# Patient Record
Sex: FEMALE | Race: WHITE | NOT HISPANIC OR LATINO | Employment: FULL TIME | URBAN - METROPOLITAN AREA
[De-identification: names, ages, dates, MRNs, and addresses within clinical notes are randomized per-mention and may not be internally consistent; named-entity substitution may affect disease eponyms.]

---

## 2017-01-07 ENCOUNTER — HOSPITAL ENCOUNTER (INPATIENT)
Facility: HOSPITAL | Age: 62
LOS: 4 days | Discharge: HOME HEALTH | End: 2017-01-11
Attending: EMERGENCY MEDICINE | Admitting: INTERNAL MEDICINE

## 2017-01-07 ENCOUNTER — APPOINTMENT (OUTPATIENT)
Dept: GENERAL RADIOLOGY | Facility: HOSPITAL | Age: 62
End: 2017-01-07

## 2017-01-07 DIAGNOSIS — R26.2 DIFFICULTY WALKING: ICD-10-CM

## 2017-01-07 DIAGNOSIS — S72.002A CLOSED FRACTURE OF NECK OF LEFT FEMUR, INITIAL ENCOUNTER (HCC): Primary | ICD-10-CM

## 2017-01-07 DIAGNOSIS — S72.009A FEMORAL NECK FRACTURE (HCC): ICD-10-CM

## 2017-01-07 LAB
ABO GROUP BLD: NORMAL
ALBUMIN SERPL-MCNC: 4.4 G/DL (ref 3.5–5.2)
ALBUMIN/GLOB SERPL: 1.6 G/DL
ALP SERPL-CCNC: 118 U/L (ref 39–117)
ALT SERPL W P-5'-P-CCNC: 23 U/L (ref 1–33)
ANION GAP SERPL CALCULATED.3IONS-SCNC: 16 MMOL/L
AST SERPL-CCNC: 23 U/L (ref 1–32)
BASOPHILS # BLD AUTO: 0.04 10*3/MM3 (ref 0–0.2)
BASOPHILS NFR BLD AUTO: 0.3 % (ref 0–1.5)
BILIRUB SERPL-MCNC: 0.5 MG/DL (ref 0.1–1.2)
BLD GP AB SCN SERPL QL: NEGATIVE
BUN BLD-MCNC: 14 MG/DL (ref 6–20)
BUN/CREAT SERPL: 25.5 (ref 7–25)
CALCIUM SPEC-SCNC: 9.3 MG/DL (ref 8.6–10.5)
CHLORIDE SERPL-SCNC: 101 MMOL/L (ref 98–107)
CO2 SERPL-SCNC: 23 MMOL/L (ref 22–29)
CREAT BLD-MCNC: 0.55 MG/DL (ref 0.57–1)
DEPRECATED RDW RBC AUTO: 46.3 FL (ref 37–54)
EOSINOPHIL # BLD AUTO: 0.13 10*3/MM3 (ref 0–0.7)
EOSINOPHIL NFR BLD AUTO: 1 % (ref 0.3–6.2)
ERYTHROCYTE [DISTWIDTH] IN BLOOD BY AUTOMATED COUNT: 12.8 % (ref 11.7–13)
GFR SERPL CREATININE-BSD FRML MDRD: 113 ML/MIN/1.73
GLOBULIN UR ELPH-MCNC: 2.7 GM/DL
GLUCOSE BLD-MCNC: 111 MG/DL (ref 65–99)
HCT VFR BLD AUTO: 40.4 % (ref 35.6–45.5)
HGB BLD-MCNC: 13.5 G/DL (ref 11.9–15.5)
IMM GRANULOCYTES # BLD: 0.02 10*3/MM3 (ref 0–0.03)
IMM GRANULOCYTES NFR BLD: 0.2 % (ref 0–0.5)
INR PPP: 0.99 (ref 0.9–1.1)
LYMPHOCYTES # BLD AUTO: 1.33 10*3/MM3 (ref 0.9–4.8)
LYMPHOCYTES NFR BLD AUTO: 10.4 % (ref 19.6–45.3)
MCH RBC QN AUTO: 33 PG (ref 26.9–32)
MCHC RBC AUTO-ENTMCNC: 33.4 G/DL (ref 32.4–36.3)
MCV RBC AUTO: 98.8 FL (ref 80.5–98.2)
MONOCYTES # BLD AUTO: 0.86 10*3/MM3 (ref 0.2–1.2)
MONOCYTES NFR BLD AUTO: 6.7 % (ref 5–12)
NEUTROPHILS # BLD AUTO: 10.46 10*3/MM3 (ref 1.9–8.1)
NEUTROPHILS NFR BLD AUTO: 81.4 % (ref 42.7–76)
PLATELET # BLD AUTO: 265 10*3/MM3 (ref 140–500)
PMV BLD AUTO: 10.2 FL (ref 6–12)
POTASSIUM BLD-SCNC: 4 MMOL/L (ref 3.5–5.2)
PROT SERPL-MCNC: 7.1 G/DL (ref 6–8.5)
PROTHROMBIN TIME: 12.7 SECONDS (ref 11.7–14.2)
RBC # BLD AUTO: 4.09 10*6/MM3 (ref 3.9–5.2)
RH BLD: NEGATIVE
SODIUM BLD-SCNC: 140 MMOL/L (ref 136–145)
TSH SERPL DL<=0.05 MIU/L-ACNC: 3.69 MIU/ML (ref 0.27–4.2)
WBC NRBC COR # BLD: 12.84 10*3/MM3 (ref 4.5–10.7)

## 2017-01-07 PROCEDURE — 84443 ASSAY THYROID STIM HORMONE: CPT | Performed by: INTERNAL MEDICINE

## 2017-01-07 PROCEDURE — 86900 BLOOD TYPING SEROLOGIC ABO: CPT

## 2017-01-07 PROCEDURE — 86901 BLOOD TYPING SEROLOGIC RH(D): CPT

## 2017-01-07 PROCEDURE — 85025 COMPLETE CBC W/AUTO DIFF WBC: CPT | Performed by: EMERGENCY MEDICINE

## 2017-01-07 PROCEDURE — 73502 X-RAY EXAM HIP UNI 2-3 VIEWS: CPT

## 2017-01-07 PROCEDURE — 25010000002 HYDROMORPHONE PER 4 MG: Performed by: EMERGENCY MEDICINE

## 2017-01-07 PROCEDURE — 25010000002 MORPHINE PER 10 MG: Performed by: INTERNAL MEDICINE

## 2017-01-07 PROCEDURE — 25010000002 HYDROMORPHONE PER 4 MG: Performed by: HOSPITALIST

## 2017-01-07 PROCEDURE — 63710000001 DIPHENHYDRAMINE PER 50 MG: Performed by: ORTHOPAEDIC SURGERY

## 2017-01-07 PROCEDURE — 25010000002 HYDROMORPHONE PER 4 MG

## 2017-01-07 PROCEDURE — 93005 ELECTROCARDIOGRAM TRACING: CPT | Performed by: EMERGENCY MEDICINE

## 2017-01-07 PROCEDURE — 93010 ELECTROCARDIOGRAM REPORT: CPT | Performed by: INTERNAL MEDICINE

## 2017-01-07 PROCEDURE — 86850 RBC ANTIBODY SCREEN: CPT

## 2017-01-07 PROCEDURE — 80053 COMPREHEN METABOLIC PANEL: CPT | Performed by: EMERGENCY MEDICINE

## 2017-01-07 PROCEDURE — 85610 PROTHROMBIN TIME: CPT | Performed by: EMERGENCY MEDICINE

## 2017-01-07 PROCEDURE — 99284 EMERGENCY DEPT VISIT MOD MDM: CPT

## 2017-01-07 PROCEDURE — 25010000002 ONDANSETRON PER 1 MG: Performed by: EMERGENCY MEDICINE

## 2017-01-07 PROCEDURE — 71010 HC CHEST PA OR AP: CPT

## 2017-01-07 RX ORDER — NALOXONE HCL 0.4 MG/ML
0.4 VIAL (ML) INJECTION
Status: DISCONTINUED | OUTPATIENT
Start: 2017-01-07 | End: 2017-01-09 | Stop reason: HOSPADM

## 2017-01-07 RX ORDER — RENO CAPS 100; 1.5; 1.7; 20; 10; 1; 150; 5; 6 MG/1; MG/1; MG/1; MG/1; MG/1; MG/1; UG/1; MG/1; UG/1
1 CAPSULE ORAL DAILY
Status: DISCONTINUED | OUTPATIENT
Start: 2017-01-07 | End: 2017-01-09 | Stop reason: HOSPADM

## 2017-01-07 RX ORDER — HYDROCODONE BITARTRATE AND ACETAMINOPHEN 7.5; 325 MG/1; MG/1
1 TABLET ORAL EVERY 4 HOURS PRN
Status: DISCONTINUED | OUTPATIENT
Start: 2017-01-07 | End: 2017-01-09 | Stop reason: HOSPADM

## 2017-01-07 RX ORDER — ACETAMINOPHEN, ASPIRIN AND CAFFEINE 250; 250; 65 MG/1; MG/1; MG/1
1 TABLET, FILM COATED ORAL EVERY 6 HOURS PRN
Status: DISCONTINUED | OUTPATIENT
Start: 2017-01-07 | End: 2017-01-09 | Stop reason: HOSPADM

## 2017-01-07 RX ORDER — CYCLOBENZAPRINE HCL 10 MG
10 TABLET ORAL EVERY 8 HOURS SCHEDULED
Status: DISCONTINUED | OUTPATIENT
Start: 2017-01-07 | End: 2017-01-07

## 2017-01-07 RX ORDER — MORPHINE SULFATE 2 MG/ML
1 INJECTION, SOLUTION INTRAMUSCULAR; INTRAVENOUS EVERY 4 HOURS PRN
Status: DISCONTINUED | OUTPATIENT
Start: 2017-01-07 | End: 2017-01-07

## 2017-01-07 RX ORDER — CYCLOBENZAPRINE HCL 10 MG
10 TABLET ORAL 3 TIMES DAILY PRN
Status: DISCONTINUED | OUTPATIENT
Start: 2017-01-07 | End: 2017-01-09 | Stop reason: HOSPADM

## 2017-01-07 RX ORDER — DIAZEPAM 5 MG/1
5 TABLET ORAL EVERY 6 HOURS PRN
Status: DISCONTINUED | OUTPATIENT
Start: 2017-01-07 | End: 2017-01-09 | Stop reason: HOSPADM

## 2017-01-07 RX ORDER — SODIUM CHLORIDE 9 MG/ML
75 INJECTION, SOLUTION INTRAVENOUS CONTINUOUS
Status: DISCONTINUED | OUTPATIENT
Start: 2017-01-07 | End: 2017-01-09 | Stop reason: HOSPADM

## 2017-01-07 RX ORDER — ONDANSETRON 2 MG/ML
4 INJECTION INTRAMUSCULAR; INTRAVENOUS ONCE
Status: COMPLETED | OUTPATIENT
Start: 2017-01-07 | End: 2017-01-07

## 2017-01-07 RX ORDER — DIPHENHYDRAMINE HCL 25 MG
25 CAPSULE ORAL EVERY 6 HOURS PRN
Status: DISCONTINUED | OUTPATIENT
Start: 2017-01-07 | End: 2017-01-09 | Stop reason: HOSPADM

## 2017-01-07 RX ORDER — CHOLECALCIFEROL (VITAMIN D3) 125 MCG
5 CAPSULE ORAL NIGHTLY
Status: DISCONTINUED | OUTPATIENT
Start: 2017-01-07 | End: 2017-01-09 | Stop reason: HOSPADM

## 2017-01-07 RX ORDER — ONDANSETRON 2 MG/ML
4 INJECTION INTRAMUSCULAR; INTRAVENOUS EVERY 6 HOURS PRN
Status: DISCONTINUED | OUTPATIENT
Start: 2017-01-07 | End: 2017-01-09 | Stop reason: HOSPADM

## 2017-01-07 RX ORDER — ACETAMINOPHEN 325 MG/1
650 TABLET ORAL EVERY 4 HOURS PRN
Status: DISCONTINUED | OUTPATIENT
Start: 2017-01-07 | End: 2017-01-09 | Stop reason: HOSPADM

## 2017-01-07 RX ORDER — SODIUM CHLORIDE 0.9 % (FLUSH) 0.9 %
1-10 SYRINGE (ML) INJECTION AS NEEDED
Status: DISCONTINUED | OUTPATIENT
Start: 2017-01-07 | End: 2017-01-09 | Stop reason: HOSPADM

## 2017-01-07 RX ORDER — HYDROMORPHONE HYDROCHLORIDE 1 MG/ML
0.5 INJECTION, SOLUTION INTRAMUSCULAR; INTRAVENOUS; SUBCUTANEOUS
Status: DISCONTINUED | OUTPATIENT
Start: 2017-01-07 | End: 2017-01-08

## 2017-01-07 RX ORDER — MULTIPLE VITAMINS W/ MINERALS TAB 9MG-400MCG
1 TAB ORAL DAILY
Status: DISCONTINUED | OUTPATIENT
Start: 2017-01-07 | End: 2017-01-09 | Stop reason: HOSPADM

## 2017-01-07 RX ORDER — HYDROCODONE BITARTRATE AND ACETAMINOPHEN 7.5; 325 MG/1; MG/1
2 TABLET ORAL EVERY 4 HOURS PRN
Status: DISCONTINUED | OUTPATIENT
Start: 2017-01-07 | End: 2017-01-09 | Stop reason: HOSPADM

## 2017-01-07 RX ADMIN — HYDROCODONE BITARTRATE AND ACETAMINOPHEN 1 TABLET: 7.5; 325 TABLET ORAL at 16:37

## 2017-01-07 RX ADMIN — HYDROMORPHONE HYDROCHLORIDE 1 MG: 1 INJECTION, SOLUTION INTRAMUSCULAR; INTRAVENOUS; SUBCUTANEOUS at 14:42

## 2017-01-07 RX ADMIN — HYDROMORPHONE HYDROCHLORIDE 1 MG: 1 INJECTION, SOLUTION INTRAMUSCULAR; INTRAVENOUS; SUBCUTANEOUS at 11:44

## 2017-01-07 RX ADMIN — ONDANSETRON 4 MG: 2 INJECTION INTRAMUSCULAR; INTRAVENOUS at 11:44

## 2017-01-07 RX ADMIN — MORPHINE SULFATE 1 MG: 2 INJECTION, SOLUTION INTRAMUSCULAR; INTRAVENOUS at 16:37

## 2017-01-07 RX ADMIN — HYDROMORPHONE HYDROCHLORIDE 0.5 MG: 1 INJECTION, SOLUTION INTRAMUSCULAR; INTRAVENOUS; SUBCUTANEOUS at 20:24

## 2017-01-07 RX ADMIN — CYCLOBENZAPRINE HYDROCHLORIDE 10 MG: 10 TABLET, FILM COATED ORAL at 17:48

## 2017-01-07 RX ADMIN — HYDROMORPHONE HYDROCHLORIDE 1 MG: 1 INJECTION, SOLUTION INTRAMUSCULAR; INTRAVENOUS; SUBCUTANEOUS at 13:14

## 2017-01-07 RX ADMIN — HYDROMORPHONE HYDROCHLORIDE 0.5 MG: 1 INJECTION, SOLUTION INTRAMUSCULAR; INTRAVENOUS; SUBCUTANEOUS at 23:27

## 2017-01-07 RX ADMIN — DIAZEPAM 5 MG: 5 TABLET ORAL at 20:29

## 2017-01-07 RX ADMIN — Medication 5 MG: at 22:40

## 2017-01-07 RX ADMIN — DIPHENHYDRAMINE HYDROCHLORIDE 25 MG: 25 CAPSULE ORAL at 17:48

## 2017-01-07 RX ADMIN — Medication 1 MG: at 13:14

## 2017-01-07 NOTE — Clinical Note
Level of Care: Med/Surg [1]   Diagnosis: Closed fracture of neck of left femur, initial encounter [836935]   Admitting Physician: CECI LR [[id number]]   Attending Physician: CECI LR [[id number]]

## 2017-01-07 NOTE — ED PROVIDER NOTES
EMERGENCY DEPARTMENT ENCOUNTER    CHIEF COMPLAINT  Chief Complaint: Fall, hip pain  History given by: Pt  History limited by: N/A  Room Number: 21/21  PMD: RODOLFO Doyle MD      HPI:  Pt is a 61 y.o. female who presents complaining of fall and subsequent L hip pain after slipping on ice in her driveway last night. Pt reports pain following the event, which radiates down her L leg and was worse when she woke this morning. Pt reports feeling a pop this morning while walking. Her pain is severe and worsens with movement. Pt also c/o L leg numbness. She smokes 1/2 pack per day and drinks 4 beers after work daily. Pt denies fever, headache, neck pain, back pain, vomiting, diarrhea, black/bloody stool, or any other injury at this time.    Duration:  1 day  Onset: Sudden  Timing: Constant  Location: L hip  Radiation: L leg  Quality: Aching  Intensity/Severity: Severe  Progression: Unchanged  Associated Symptoms: L leg numbness  Aggravating Factors: Movement, standing  Alleviating Factors: Nothing  Previous Episodes: No  Treatment before arrival: None specified    PAST MEDICAL HISTORY  Active Ambulatory Problems     Diagnosis Date Noted   • No Active Ambulatory Problems     Resolved Ambulatory Problems     Diagnosis Date Noted   • No Resolved Ambulatory Problems     No Additional Past Medical History       PAST SURGICAL HISTORY  Past Surgical History   Procedure Laterality Date   • Colon resection left  06/2013     due to several ruptured diverticuli        FAMILY HISTORY  History reviewed. No pertinent family history.    SOCIAL HISTORY  Social History     Social History   • Marital status:      Spouse name: N/A   • Number of children: N/A   • Years of education: N/A     Occupational History   • Not on file.     Social History Main Topics   • Smoking status: Current Every Day Smoker     Packs/day: 0.25     Types: Cigarettes   • Smokeless tobacco: Not on file   • Alcohol use 1.2 - 1.8 oz/week     2 - 3 Cans of  beer per week      Comment: per day    • Drug use: No   • Sexual activity: Defer     Other Topics Concern   • Not on file     Social History Narrative       ALLERGIES  Penicillins    REVIEW OF SYSTEMS  Review of Systems   Constitutional: Negative.  Negative for fever and unexpected weight change.   HENT: Negative.    Respiratory: Negative.    Cardiovascular: Negative.    Gastrointestinal: Negative.  Negative for blood in stool, diarrhea and vomiting.   Genitourinary: Negative.    Musculoskeletal: Positive for arthralgias (L hip). Negative for back pain and neck pain.   Neurological: Positive for numbness (L leg). Negative for headaches.   All other systems reviewed and are negative.      PHYSICAL EXAM  ED Triage Vitals   Temp Heart Rate Resp BP SpO2   01/07/17 1118 01/07/17 1118 01/07/17 1118 01/07/17 1118 01/07/17 1118   98.2 °F (36.8 °C) 71 18 149/91 97 %      Temp src Heart Rate Source Patient Position BP Location FiO2 (%)   01/07/17 1118 01/07/17 1118 -- -- --   Oral Monitor          Physical Exam   Constitutional: She is oriented to person, place, and time.   HENT:   Head: Normocephalic and atraumatic.   Mouth/Throat: Oropharynx is clear and moist.   Eyes: EOM are normal. Pupils are equal, round, and reactive to light.   Neck: Normal range of motion. Neck supple.   Cardiovascular: Normal rate, regular rhythm and normal heart sounds.    Pulmonary/Chest: Effort normal and breath sounds normal. No respiratory distress.   Abdominal: Soft. There is no tenderness. There is no rebound and no guarding.   Musculoskeletal: She exhibits no edema.        Right hip: Normal.        Left hip: She exhibits decreased range of motion (secondary to pain), tenderness, bony tenderness and deformity (shortening and external rotation).   Neurological: She is alert and oriented to person, place, and time. She has normal sensation and normal strength.   Skin: Skin is warm and dry. No rash noted.   Psychiatric: Mood and affect normal.    Nursing note and vitals reviewed.      LAB RESULTS  Lab Results (last 24 hours)     Procedure Component Value Units Date/Time    CBC & Differential [[id number]] Collected:  01/07/17 1313    Specimen:  Blood Updated:  01/07/17 1331    Narrative:       The following orders were created for panel order CBC & Differential.  Procedure                               Abnormality         Status                     ---------                               -----------         ------                     CBC Auto Differential[[id number]]         Abnormal            Final result                 Please view results for these tests on the individual orders.    Comprehensive Metabolic Panel [[id number]]  (Abnormal) Collected:  01/07/17 1313    Specimen:  Blood Updated:  01/07/17 1354     Glucose 111 (H) mg/dL      BUN 14 mg/dL      Creatinine 0.55 (L) mg/dL      Sodium 140 mmol/L      Potassium 4.0 mmol/L      Chloride 101 mmol/L      CO2 23.0 mmol/L      Calcium 9.3 mg/dL      Total Protein 7.1 g/dL      Albumin 4.40 g/dL      ALT (SGPT) 23 U/L      AST (SGOT) 23 U/L      Alkaline Phosphatase 118 (H) U/L      Total Bilirubin 0.5 mg/dL      eGFR Non African Amer 113 mL/min/1.73      Globulin 2.7 gm/dL      A/G Ratio 1.6 g/dL      BUN/Creatinine Ratio 25.5 (H)      Anion Gap 16.0 mmol/L     Protime-INR [[id number]]  (Normal) Collected:  01/07/17 1313    Specimen:  Blood Updated:  01/07/17 1339     Protime 12.7 Seconds      INR 0.99     CBC Auto Differential [[id number]]  (Abnormal) Collected:  01/07/17 1313    Specimen:  Blood Updated:  01/07/17 1331     WBC 12.84 (H) 10*3/mm3      RBC 4.09 10*6/mm3      Hemoglobin 13.5 g/dL      Hematocrit 40.4 %      MCV 98.8 (H) fL      MCH 33.0 (H) pg      MCHC 33.4 g/dL      RDW 12.8 %      RDW-SD 46.3 fl      MPV 10.2 fL      Platelets 265 10*3/mm3      Neutrophil % 81.4 (H) %      Lymphocyte % 10.4 (L) %      Monocyte % 6.7 %      Eosinophil % 1.0 %      Basophil % 0.3 %      Immature Grans % 0.2 %       Neutrophils, Absolute 10.46 (H) 10*3/mm3      Lymphocytes, Absolute 1.33 10*3/mm3      Monocytes, Absolute 0.86 10*3/mm3      Eosinophils, Absolute 0.13 10*3/mm3      Basophils, Absolute 0.04 10*3/mm3      Immature Grans, Absolute 0.02 10*3/mm3           I ordered the above labs and reviewed the results    RADIOLOGY  XR Chest 1 View   Final Result   No evidence for acute pulmonary process. Follow-up as   clinical indications persist.       This report was finalized on 1/7/2017 1:18 PM by Dr. Prabhu Bowen MD.          XR Hip With or Without Pelvis 2 - 3 View Left   Preliminary Result   Acute left subcapital femoral neck fracture. There is varus   angulation which might account for the poor demarcation of the fracture   plane along the femoral head side of the fracture. However, the   possibility of an underlying bone lesion should be considered. There are   no findings elsewhere to suggest other bone lesions.             I ordered the above noted radiological studies. Interpreted by radiologist. Discussed with radiologist. Reviewed by me in PACS.     EKG         EKG time: 13:11  Rhythm/Rate: NSR at 78 bpm  P waves and NM: Normal  QRS, axis: RBBB  ST and T waves: Unremarkable  Interpreted contemporaneously by me and independently viewed.  No prior EKG for comparison.    PROCEDURES  Procedures      PROGRESS AND CONSULTS  ED Course   Comment By Time   11:29 AM  Pt with fall last night.  L hip pain increased over night.  Now unable to bear weight.  Exam suggests femoral fx.  Will give IV DZ for pain. Hiren Crowe MD 01/07 1130   11:22 AM:  Vitals: BP: 149/91 HR: 71 Temp: 98.2 °F (36.8 °C) (Oral) O2 sat: 97%  D/w pt plan for XR L Hip for further evaluation. Ordered Dilaudid and Zofran for pain management. Pt understands and agrees with the plan, all questions answered.    1:04 PM:  Call placed to [facility] for admission, Ordered labs, CXR, and EKG for further evaluation.    1:15 PM:  Vitals: BP: 139/87 HR: 71 Temp:  98.2 °F (36.8 °C) (Oral) O2 sat: 94%  Rechecked pt. Pt is resting comfortably. Discussed results of imaging, which showed fx, and the need for admission for further care. Pt understands and agrees with the plan, all questions answered.    1:30 PM:  Discussed pt's case with Dr. Latasha Faulkner ([facility]), who agrees to admit the pt for further care. Call placed to Ortho for consult.    2:06 PM:  Vitals: BP: 142/82 HR: 71 Temp: 98.2 °F (36.8 °C) (Oral) O2 sat: 95%  Rechecked pt. Pt is resting comfortably. Discussed that I have not heard from ortho, but that I have spoken to Dr. Faulkner, who will admit the pt. Pt understands and agrees with the plan, all questions answered.    2:14 PM:  Discussed pt's case with Dr. Horvath ([facility]), who agrees to consult with the pt upon admission.    MEDICAL DECISION MAKING  Results were reviewed/discussed with the patient and they were also made aware of online access. Pt also made aware that some labs, such as cultures, will not be resulted during ER visit and follow up with PMD is necessary.     MDM  Number of Diagnoses or Management Options  Closed fracture of neck of left femur, initial encounter:      Amount and/or Complexity of Data Reviewed  Clinical lab tests: ordered and reviewed  Tests in the radiology section of CPT®: ordered and reviewed  Tests in the medicine section of CPT®: ordered and reviewed  Discussion of test results with the performing providers: yes  Discuss the patient with other providers: yes  Independent visualization of images, tracings, or specimens: yes    Patient Progress  Patient progress: stable         DIAGNOSIS  Final diagnoses:   Closed fracture of neck of left femur, initial encounter       DISPOSITION  ADMISSION    Discussed treatment plan and reason for admission with pt/family and admitting physician.  Pt/family voiced understanding of the plan for admission for further testing/treatment as needed.     Latest Documented Vital Signs:  As of 2:14 PM  BP-  142/82 HR- 71 Temp- 98.2 °F (36.8 °C) (Oral) O2 sat- 95%    --  Documentation assistance provided by rody Zelaya for Dr. Crowe.  Information recorded by the scribe was done at my direction and has been verified and validated by me.     Jd Zelaya  01/07/17 1414       Hiren Crowe MD  01/07/17 1026

## 2017-01-07 NOTE — H&P
Dictated    1. Left subcap femur fracture    2. cigs  3. etoh  4. Abn ekg  5. 2013 partial colectomy for ruptured tics    [id number]

## 2017-01-07 NOTE — IP AVS SNAPSHOT
AFTER VISIT SUMMARY             Chantell Knight           About your hospitalization     You were admitted on:  January 7, 2017 You last received care in the:  [address]       Procedures & Surgeries      Procedure(s) (LRB):  LEFT TOTAL HIP ARTHROPLASTY (POSTERIOR) (Left)     1/7/2017 - 1/9/2017     Surgeon(s):  Trever Marcos MD  -------------------      Medications    If you or your caregiver advised us that you are currently taking a medication and that medication is marked below as “Resume”, this simply indicates that we have reviewed those medications to make sure our new therapy recommendations do not interfere.  If you have concerns about medications other than those new ones which we are prescribing today, please consult the physician who prescribed them (or your primary physician).  Our review of your home medications is not meant to indicate that we are directing their use.             Your Medications      START taking these medications     acetaminophen 325 MG tablet   Take 2 tablets by mouth Every 6 (Six) Hours As Needed for mild pain (1-3) for up to 5 days. Indications: Pain   Commonly known as:  TYLENOL   Notes to Patient:  Every 6 hours as needed           aspirin 325 MG EC tablet   Take 1 tablet by mouth 2 (Two) Times a Day for 29 days.   Last time this was given:  1/11/2017  8:38 AM   Next Dose Due:  6:00pm 1/11/17           HYDROcodone-acetaminophen 7.5-325 MG per tablet   Take 2 tablets by mouth Every 4 (Four) Hours As Needed for severe pain (7-10) for up to 8 days.   Last time this was given:  1/11/2017  7:34 AM   Commonly known as:  NORCO   Notes to Patient:  Every 4 hours as needed           melatonin 1 MG tablet   Take 1 tablet by mouth At Night As Needed for sleep for up to 5 days. Indications: Trouble Sleeping   Last time this was given:  1/8/2017  9:03 PM   Notes to Patient:  Every night as needed           meloxicam 15 MG tablet   Take 1 tablet by mouth Daily for  29 days. Indications: Joint Damage causing Pain and Loss of Function   Last time this was given:  1/11/2017  8:38 AM   Commonly known as:  MOBIC             CONTINUE taking these medications     amphetamine-dextroamphetamine 10 MG tablet   TK 1 T PO QD FOR ADD   Commonly known as:  ADDERALL   Notes to Patient:  As directed           aspirin-acetaminophen-caffeine 250-250-65 MG per tablet   Take 1 tablet by mouth Every 6 (Six) Hours As Needed for headaches.   Commonly known as:  EXCEDRIN MIGRAINE   Notes to Patient:  Every 6 hours as needed           b complex-C-folic acid 1 MG capsule   Take 1 capsule by mouth Daily.   Notes to Patient:  As dierected           fish oil 1000 MG capsule capsule   Take 2,000 mg by mouth Daily With Breakfast.   Notes to Patient:  As directed           MULTIVITAL PO   Take 1 tablet by mouth Daily.   Notes to Patient:  As directed                Where to Get Your Medications      These medications were sent to [facility] [address] [place] [address] [address] [facility] - [phone] [facility] [phone] [address] [address]     Phone:  [phone]     meloxicam 15 MG tablet         You can get these medications from any pharmacy     Bring a paper prescription for each of these medications     HYDROcodone-acetaminophen 7.5-325 MG per tablet         Information about where to get these medications is not yet available     ! Ask your nurse or doctor about these medications     acetaminophen 325 MG tablet    aspirin 325 MG EC tablet    melatonin 1 MG tablet                  Your Medications      Your Medication List           Morning Noon Evening Bedtime As Needed    acetaminophen 325 MG tablet   Take 2 tablets by mouth Every 6 (Six) Hours As Needed for mild pain (1-3) for up to 5 days. Indications: Pain   Commonly known as:  TYLENOL   Notes to Patient:  Every 6 hours as needed                                    amphetamine-dextroamphetamine 10 MG tablet   TK 1 T PO QD FOR ADD   Commonly known as:  ADDERALL   Notes to Patient:  As directed                                   aspirin 325 MG EC tablet   Take 1 tablet by mouth 2 (Two) Times a Day for 29 days.                                      aspirin-acetaminophen-caffeine 250-250-65 MG per tablet   Take 1 tablet by mouth Every 6 (Six) Hours As Needed for headaches.   Commonly known as:  EXCEDRIN MIGRAINE   Notes to Patient:  Every 6 hours as needed                                   b complex-C-folic acid 1 MG capsule   Take 1 capsule by mouth Daily.   Notes to Patient:  As dierected                                   fish oil 1000 MG capsule capsule   Take 2,000 mg by mouth Daily With Breakfast.   Notes to Patient:  As directed                                   HYDROcodone-acetaminophen 7.5-325 MG per tablet   Take 2 tablets by mouth Every 4 (Four) Hours As Needed for severe pain (7-10) for up to 8 days.   Commonly known as:  NORCO   Notes to Patient:  Every 4 hours as needed                                   melatonin 1 MG tablet   Take 1 tablet by mouth At Night As Needed for sleep for up to 5 days. Indications: Trouble Sleeping   Notes to Patient:  Every night as needed                                   meloxicam 15 MG tablet   Take 1 tablet by mouth Daily for 29 days. Indications: Joint Damage causing Pain and Loss of Function   Commonly known as:  MOBIC                                   MULTIVITAL PO   Take 1 tablet by mouth Daily.   Notes to Patient:  As directed                                            Instructions for After Discharge        Activity Instructions     Other Instructions (Specify)       Restrictions per ortho           Additional Activity Instructions:      WEIGHT BEARING AS TOLERATED WITH ASSIST OF A WALKER              Diet Instructions     Diet: Regular; Thin Liquids, No Restrictions       Discharge Diet:  Regular   Fluid Consistency:  Thin Liquids,  No Restrictions               REGULAR DIET              Other Instructions     Commode Chair       Equipment:   Bedside Commode Chair w/Fixed Arms   Length of Need (99 Months = Lifetime):  99 Months = Lifetime             Discharge References/Attachments     TOTAL HIP REPLACEMENT (ENGLISH)    ASPIRIN, ASA ORAL TABLETS (ENGLISH)    ACETAMINOPHEN; HYDROCODONE TABLETS OR CAPSULES (ENGLISH)    FALL PREVENTION AND HOME SAFETY (ENGLISH)    WALKER USE (ENGLISH)    CRYOTHERAPY (ENGLISH)    COMPRESSION STOCKINGS (ENGLISH)       Follow-ups for After Discharge        Follow-up Information     Follow up with RODOLFO Doyle MD Follow up in 2 week(s).    Specialty:  General Practice    Why:  abn EKG, leukocytosis    Contact information:    [address] [facility]  [address]  [facility] [id number] [phone]          Follow up with Trever Marcos MD. Schedule an appointment as soon as possible for a visit in 2 week(s).    Specialty:  Orthopedic Surgery    Contact information:    [address] [facility] [address]  [facility] [address]  [phone]          Follow up with [facility] .    Specialty:  Home Health Services    Contact information:    [address]  [place] [address] [phone]      Referrals and Follow-ups to Schedule     Referral to Home Health    As directed    Face to Face Visit Date:  1/11/2017   Follow-up Provider for Plan of Care?:  I treated the patient in an acute care facility and will not continue treatment after discharge.   Follow-up Provider:  BRYANT DOYLE   Reason/Clinical Findings:  left hip fracture and acute blood loss   Describe mobility limitations that make leaving home difficult:  left hip fracture   Nursing/Therapeutic Services Requested:  Physical Therapy   PT orders:   Strengthening  Therapeutic exercise      Frequency:  1 Week 1             MyChart Signup     [facility] allows you to send messages to your doctor, view your test  results, renew your prescriptions, schedule appointments, and more. To sign up, go to [email or web address] and click on the Sign Up Now link in the New User? box. Enter your Blend Activation Code exactly as it appears below along with the last four digits of your Social Security Number and your Date of Birth () to complete the sign-up process. If you do not sign up before the expiration date, you must request a new code.    Blend Activation Code: [email or web address]  Expires: 2017  9:02 AM    If you have questions, you can email [email or web address] or call [phone] to talk to our Blend staff. Remember, Blend is NOT to be used for urgent needs. For medical emergencies, dial 911.           Summary of Your Hospitalization        Reason for Hospitalization     Your primary diagnosis was:  Broken Hip    Your diagnoses also included:  Abnormal Ekg, Hyperglycemia, Alcohol Dependence, Tobacco Abuse, Elevated White Blood Cell Count      Care Providers     Provider Service Role Specialty    Latasha Faulkner MD Internal Medicine Attending Provider Internal Medicine    Latasha Faulkner MD Internal Medicine Consulting Physician  Internal Medicine    Fausto Horvath MD -- Surgeon  Orthopedic Surgery    Trever Marcos MD -- Surgeon  Orthopedic Surgery       Your Allergies  Date Reviewed: 2017    Allergen Reactions    Penicillins Diarrhea      Pending Labs     Order Current Status    Tissue Exam In process      Patient Belongings Returned     Document Return of Belongings Flowsheet     Were the patient bedside belongings sent home?   --   Belongings Retrieved from Security & Sent Home   --    Belongings Sent to Safe   --   Medications Retrieved from Pharmacy & Sent Home   --              More Information      Total Hip Replacement  Total hip replacement is a surgical procedure to remove damaged bone in your hip joint and replace it with an artificial hip joint (prosthetic hip joint). The purpose of  this surgery is to reduce pain and to improve your hip function.   During a total hip replacement, one or both parts of the hip joint are replaced, depending on the type of joint damage you have. The hip is a ball-and-socket type of joint, and it has two main parts. The ball part of the joint (femoral head) is the top of the thigh bone (femur). The socket part of the joint is a large indent in the side of your pelvis (acetabulum) where the femur and pelvis meet.  LET YOUR HEALTH CARE PROVIDER KNOW ABOUT:  · Any allergies you have.  · All medicines you are taking, including vitamins, herbs, eye drops, creams, and over-the-counter medicines.  · Previous problems you or members of your family have had with the use of anesthetics.  · Any blood disorders you have.  · Previous surgeries you have had.  · Medical conditions you have.  RISKS AND COMPLICATIONS   Generally, total hip replacement is a safe procedure. However, problems can occur, including:  · Infection.  · Dislocation (the ball of the hip-joint prosthesis comes out of contact with the socket).  · Loosening of the piece (stem) that connects the prosthetic femoral head to the femur.  · Fracture of the bone while inserting the prosthesis.  · Formation of blood clots, which can break loose and travel to and injure your lungs (pulmonary embolus).  BEFORE THE PROCEDURE   · Plan to have someone take you home after the procedure.  · Do not eat or drink anything after midnight on the night before the procedure or as directed by your health care provider.  · Ask your health care provider about:  ¨ Changing or stopping your regular medicines. This is especially important if you are taking diabetes medicines or blood thinners.  ¨ Taking medicines such as aspirin and ibuprofen. These medicines can thin your blood. Do not take these medicines before your procedure if your health care provider asks you not to.  · Ask your health care provider about how your surgical site will  be marked or identified.  · You may be given antibiotic medicines to help prevent infection.  PROCEDURE   · To reduce your risk of infection:    Your health care team will wash or sanitize their hands.    Your skin will be washed with soap.  · An IV tube will be inserted into one of your veins. You will be given one or more of the following:    A medicine that makes you drowsy (sedative).    A medicine that makes you fall asleep (general anesthetic).    A medicine injected into your spine that numbs your body below the waist (spinal anesthetic).  · An incision will be made in your hip. Your surgeon will take out any damaged cartilage and bone.  · Your surgeon will then:    Insert a prosthetic socket into the acetabulum of your pelvis. This is usually secured with screws.    Remove the femoral head and replace it with a prosthetic ball and stem secured into the top of your femur.    Place the ball into the socket and check the range of motion and stability of your new hip.    Close the incision and apply a bandage over the surgical site.  AFTER THE PROCEDURE   · You will stay in a recovery area until the medicines have worn off.  · Your vital signs, such as your pulse and blood pressure, will be monitored.  · Once you are awake and stable, you will be taken to a hospital room.  · You may be directed to take actions to help prevent blood clots. These may include:    Walking soon after surgery, with someone assisting you. Moving around after surgery helps to improve blood flow.    Taking medicines to thin your blood (anticoagulants).    Wearing compression stockings or using different types of devices.  · You will receive physical therapy until you are doing well and your health care provider feels it is safe for you to go home.     This information is not intended to replace advice given to you by your health care provider. Make sure you discuss any questions you have with your health care provider.     Document  Released: 03/26/2002 Document Revised: 09/07/2016 Document Reviewed: 02/18/2015  [employer] Interactive Patient Education ©2016 Elsevier Inc.          Aspirin, ASA oral tablets  What is this medicine?  ASPIRIN (AS pir in) is a pain reliever. It is used to treat mild pain and fever. This medicine is also used as directed by a doctor to prevent and to treat heart attacks, to prevent strokes, and to treat arthritis or inflammation.  This medicine may be used for other purposes; ask your health care provider or pharmacist if you have questions.  What should I tell my health care provider before I take this medicine?  They need to know if you have any of these conditions:  -anemia  -asthma  -bleeding problems  -child with chickenpox, the flu, or other viral infection  -diabetes  -gout  -if you frequently drink alcohol containing drinks  -kidney disease  -liver disease  -low level of vitamin K  -lupus  -smoke tobacco  -stomach ulcers or other problems  -an unusual or allergic reaction to aspirin, tartrazine dye, other medicines, dyes, or preservatives  -pregnant or trying to get pregnant  -breast-feeding  How should I use this medicine?  Take this medicine by mouth with a glass of water. Follow the directions on the package or prescription label. You can take this medicine with or without food. If it upsets your stomach, take it with food. Do not take your medicine more often than directed.  Talk to your pediatrician regarding the use of this medicine in children. While this drug may be prescribed for children as young as 12 years of age for selected conditions, precautions do apply. Children and teenagers should not use this medicine to treat chicken pox or flu symptoms unless directed by a doctor.  Patients over 65 years old may have a stronger reaction and need a smaller dose.  Overdosage: If you think you have taken too much of this medicine contact a poison control center or emergency room at once.  NOTE: This medicine  is only for you. Do not share this medicine with others.  What if I miss a dose?  If you are taking this medicine on a regular schedule and miss a dose, take it as soon as you can. If it is almost time for your next dose, take only that dose. Do not take double or extra doses.  What may interact with this medicine?  Do not take this medicine with any of the following medications:  -cidofovir  -ketorolac  -probenecid  This medicine may also interact with the following medications:  -alcohol  -alendronate  -bismuth subsalicylate  -flavocoxid  -herbal supplements like feverfew, garlic, julius, ginkgo biloba, horse chestnut  -medicines for diabetes or glaucoma like acetazolamide, methazolamide  -medicines for gout  -medicines that treat or prevent blood clots like enoxaparin, heparin, ticlopidine, warfarin  -other aspirin and aspirin-like medicines  -NSAIDs, medicines for pain and inflammation, like ibuprofen or naproxen  -pemetrexed  -sulfinpyrazone  -varicella live vaccine  This list may not describe all possible interactions. Give your health care provider a list of all the medicines, herbs, non-prescription drugs, or dietary supplements you use. Also tell them if you smoke, drink alcohol, or use illegal drugs. Some items may interact with your medicine.  What should I watch for while using this medicine?  If you are treating yourself for pain, tell your doctor or health care professional if the pain lasts more than 10 days, if it gets worse, or if there is a new or different kind of pain. Tell your doctor if you see redness or swelling. Also, check with your doctor if you have a fever that lasts for more than 3 days. Only take this medicine to prevent heart attacks or blood clotting if prescribed by your doctor or health care professional.  Do not take aspirin or aspirin-like medicines with this medicine. Too much aspirin can be dangerous. Always read the labels carefully.  This medicine can irritate your stomach or  cause bleeding problems. Do not smoke cigarettes or drink alcohol while taking this medicine. Do not lie down for 30 minutes after taking this medicine to prevent irritation to your throat.  If you are scheduled for any medical or dental procedure, tell your healthcare provider that you are taking this medicine. You may need to stop taking this medicine before the procedure.  This medicine may be used to treat migraines. If you take migraine medicines for 10 or more days a month, your migraines may get worse. Keep a diary of headache days and medicine use. Contact your healthcare professional if your migraine attacks occur more frequently.  What side effects may I notice from receiving this medicine?  Side effects that you should report to your doctor or health care professional as soon as possible:  -allergic reactions like skin rash, itching or hives, swelling of the face, lips, or tongue  -breathing problems  -changes in hearing, ringing in the ears  -confusion  -general ill feeling or flu-like symptoms  -pain on swallowing  -redness, blistering, peeling or loosening of the skin, including inside the mouth or nose  -signs and symptoms of bleeding such as bloody or black, tarry stools; red or dark-brown urine; spitting up blood or brown material that looks like coffee grounds; red spots on the skin; unusual bruising or bleeding from the eye, gums, or nose  -trouble passing urine or change in the amount of urine  -unusually weak or tired  -yellowing of the eyes or skin  Side effects that usually do not require medical attention (report to your doctor or health care professional if they continue or are bothersome):  -diarrhea or constipation  -headache  -nausea, vomiting  -stomach gas, heartburn  This list may not describe all possible side effects. Call your doctor for medical advice about side effects. You may report side effects to FDA at 0-108-FDA-5056.  Where should I keep my medicine?  Keep out of the reach of  children.  Store at room temperature between 15 and 30 degrees C (59 and 86 degrees F). Protect from heat and moisture. Do not use this medicine if it has a strong vinegar smell. Throw away any unused medicine after the expiration date.  NOTE: This sheet is a summary. It may not cover all possible information. If you have questions about this medicine, talk to your doctor, pharmacist, or health care provider.     © 2016, Elsevier/Gold Standard. (2014-08-19 11:30:31)          Acetaminophen; Hydrocodone tablets or capsules  What is this medicine?  ACETAMINOPHEN; HYDROCODONE (a set a PATRICK sridhar fen; verena droe KOE done) is a pain reliever. It is used to treat moderate to severe pain.  This medicine may be used for other purposes; ask your health care provider or pharmacist if you have questions.  What should I tell my health care provider before I take this medicine?  They need to know if you have any of these conditions:  -brain tumor  -Crohn's disease, inflammatory bowel disease, or ulcerative colitis  -drug abuse or addiction  -head injury  -heart or circulation problems  -if you often drink alcohol  -kidney disease or problems going to the bathroom  -liver disease  -lung disease, asthma, or breathing problems  -an unusual or allergic reaction to acetaminophen, hydrocodone, other opioid analgesics, other medicines, foods, dyes, or preservatives  -pregnant or trying to get pregnant  -breast-feeding  How should I use this medicine?  Take this medicine by mouth. Swallow it with a full glass of water. Follow the directions on the prescription label. If the medicine upsets your stomach, take the medicine with food or milk. Do not take more than you are told to take.  Talk to your pediatrician regarding the use of this medicine in children. This medicine is not approved for use in children.  Patients over 65 years may have a stronger reaction and need a smaller dose.  Overdosage: If you think you have taken too much of this  medicine contact a poison control center or emergency room at once.  NOTE: This medicine is only for you. Do not share this medicine with others.  What if I miss a dose?  If you miss a dose, take it as soon as you can. If it is almost time for your next dose, take only that dose. Do not take double or extra doses.  What may interact with this medicine?  -alcohol  -antihistamines  -isoniazid  -medicines for depression, anxiety, or psychotic disturbances  -medicines for sleep  -muscle relaxants  -naltrexone  -narcotic medicines (opiates) for pain  -phenobarbital  -ritonavir  -tramadol  This list may not describe all possible interactions. Give your health care provider a list of all the medicines, herbs, non-prescription drugs, or dietary supplements you use. Also tell them if you smoke, drink alcohol, or use illegal drugs. Some items may interact with your medicine.  What should I watch for while using this medicine?  Tell your doctor or health care professional if your pain does not go away, if it gets worse, or if you have new or a different type of pain. You may develop tolerance to the medicine. Tolerance means that you will need a higher dose of the medicine for pain relief. Tolerance is normal and is expected if you take the medicine for a long time.  Do not suddenly stop taking your medicine because you may develop a severe reaction. Your body becomes used to the medicine. This does NOT mean you are addicted. Addiction is a behavior related to getting and using a drug for a non-medical reason. If you have pain, you have a medical reason to take pain medicine. Your doctor will tell you how much medicine to take. If your doctor wants you to stop the medicine, the dose will be slowly lowered over time to avoid any side effects.  You may get drowsy or dizzy when you first start taking the medicine or change doses. Do not drive, use machinery, or do anything that may be dangerous until you know how the medicine  affects you. Stand or sit up slowly.  There are different types of narcotic medicines (opiates) for pain. If you take more than one type at the same time, you may have more side effects. Give your health care provider a list of all medicines you use. Your doctor will tell you how much medicine to take. Do not take more medicine than directed. Call emergency for help if you have problems breathing.  The medicine will cause constipation. Try to have a bowel movement at least every 2 to 3 days. If you do not have a bowel movement for 3 days, call your doctor or health care professional.  Too much acetaminophen can be very dangerous. Do not take Tylenol (acetaminophen) or medicines that contain acetaminophen with this medicine. Many non-prescription medicines contain acetaminophen. Always read the labels carefully.  What side effects may I notice from receiving this medicine?  Side effects that you should report to your doctor or health care professional as soon as possible:  -allergic reactions like skin rash, itching or hives, swelling of the face, lips, or tongue  -breathing problems  -confusion  -feeling faint or lightheaded, falls  -stomach pain  -yellowing of the eyes or skin  Side effects that usually do not require medical attention (report to your doctor or health care professional if they continue or are bothersome):  -nausea, vomiting  -stomach upset  This list may not describe all possible side effects. Call your doctor for medical advice about side effects. You may report side effects to FDA at 4-621-FDA-9308.  Where should I keep my medicine?  Keep out of the reach of children. This medicine can be abused. Keep your medicine in a safe place to protect it from theft. Do not share this medicine with anyone. Selling or giving away this medicine is dangerous and against the law.  This medicine may cause accidental overdose and death if it taken by other adults, children, or pets. Mix any unused medicine with a  substance like cat litter or coffee grounds. Then throw the medicine away in a sealed container like a sealed bag or a coffee can with a lid. Do not use the medicine after the expiration date.  Store at room temperature between 15 and 30 degrees C (59 and 86 degrees F).  NOTE: This sheet is a summary. It may not cover all possible information. If you have questions about this medicine, talk to your doctor, pharmacist, or health care provider.     © 2016, [employer]. (2015-11-18 15:29:20)          Fall Prevention in the Home   Falls can cause injuries and can affect people from all age groups. There are many simple things that you can do to make your home safe and to help prevent falls.  WHAT CAN I DO ON THE OUTSIDE OF MY HOME?  · Regularly repair the edges of walkways and driveways and fix any cracks.  · Remove high doorway thresholds.  · Trim any shrubbery on the main path into your home.  · Use bright outdoor lighting.  · Clear walkways of debris and clutter, including tools and rocks.  · Regularly check that handrails are securely fastened and in good repair. Both sides of any steps should have handrails.  · Install guardrails along the edges of any raised decks or porches.  · Have leaves, snow, and ice cleared regularly.  · Use sand or salt on walkways during winter months.  · In the garage, clean up any spills right away, including grease or oil spills.  WHAT CAN I DO IN THE BATHROOM?  · Use night lights.  · Install grab bars by the toilet and in the tub and shower. Do not use towel bars as grab bars.  · Use non-skid mats or decals on the floor of the tub or shower.  · If you need to sit down while you are in the shower, use a plastic, non-slip stool..  · Keep the floor dry. Immediately clean up any water that spills on the floor.  · Remove soap buildup in the tub or shower on a regular basis.  · Attach bath mats securely with double-sided non-slip rug tape.  · Remove throw rugs and other tripping  hazards from the floor.  WHAT CAN I DO IN THE BEDROOM?  · Use night lights.  · Make sure that a bedside light is easy to reach.  · Do not use oversized bedding that drapes onto the floor.  · Have a firm chair that has side arms to use for getting dressed.  · Remove throw rugs and other tripping hazards from the floor.  WHAT CAN I DO IN THE KITCHEN?   · Clean up any spills right away.  · Avoid walking on wet floors.  · Place frequently used items in easy-to-reach places.  · If you need to reach for something above you, use a sturdy step stool that has a grab bar.  · Keep electrical cables out of the way.  · Do not use floor polish or wax that makes floors slippery. If you have to use wax, make sure that it is non-skid floor wax.  · Remove throw rugs and other tripping hazards from the floor.  WHAT CAN I DO IN THE STAIRWAYS?  · Do not leave any items on the stairs.  · Make sure that there are handrails on both sides of the stairs. Fix handrails that are broken or loose. Make sure that handrails are as long as the stairways.  · Check any carpeting to make sure that it is firmly attached to the stairs. Fix any carpet that is loose or worn.  · Avoid having throw rugs at the top or bottom of stairways, or secure the rugs with carpet tape to prevent them from moving.  · Make sure that you have a light switch at the top of the stairs and the bottom of the stairs. If you do not have them, have them installed.  WHAT ARE SOME OTHER FALL PREVENTION TIPS?  · Wear closed-toe shoes that fit well and support your feet. Wear shoes that have rubber soles or low heels.  · When you use a stepladder, make sure that it is completely opened and that the sides are firmly locked. Have someone hold the ladder while you are using it. Do not climb a closed stepladder.  · Add color or contrast paint or tape to grab bars and handrails in your home. Place contrasting color strips on the first and last steps.  · Use mobility aids as needed, such  as canes, walkers, scooters, and crutches.  · Turn on lights if it is dark. Replace any light bulbs that burn out.  · Set up furniture so that there are clear paths. Keep the furniture in the same spot.  · Fix any uneven floor surfaces.  · Choose a carpet design that does not hide the edge of steps of a stairway.  · Be aware of any and all pets.  · Review your medicines with your healthcare provider. Some medicines can cause dizziness or changes in blood pressure, which increase your risk of falling.  Talk with your health care provider about other ways that you can decrease your risk of falls. This may include working with a physical therapist or  to improve your strength, balance, and endurance.     This information is not intended to replace advice given to you by your health care provider. Make sure you discuss any questions you have with your health care provider.     Document Released: 12/08/2003 Document Revised: 05/03/2016 Document Reviewed: 01/22/2016  [employer] Interactive Patient Education ©2016 [employer]          Walker Use  HOW TO TELL IF A WALKER IS THE RIGHT SIZE  · With your arms hanging at your sides, the walker handles should be at wrist level. If you cannot find the exact fit, choose the height that is most comfortable.  · If you have been instructed to not place weight on one of your legs, you may feel more comfortable with a shorter height. If you are using the walker for balance, you may prefer a taller height.  · Adjust the height by using the push buttons on the legs of your walker.  · In rest position, the back leg of the walkers should be no further ahead than your toes. With your hands resting on the , your elbows should be slightly bent at about a 30 degree angle.  · Ask your physical therapist or caregiver if you have any concerns.  HOW TO USE A STANDARD WALKER (NO WHEELS)  · Pick your walker up (do not slide your walker) and place it one step length in front of you. The  "back legs of the walker should be no further ahead than your toes. You should not feel like you need to lean forward to keep your hands on the . As you set the walker down, make sure all 4 leg tips contact the ground at the same time.  · Hold onto the walker for support and step forward with your weaker leg into the middle of the walker. Follow the weight bearing instructions your caregiver has given you.  · Push down with your hands and step forward with your stronger leg.  · Be careful not to let the walker get too far ahead of you as you walk.  · Repeat the process for each step.  HOW TO USE A FRONT-WHEELED WALKER  · Slide your walker forward. The back legs of the walker should be no further ahead than your toes. You should not feel like you need to lean forward to keep your hands on the .  · Hold onto the walker for support and step forward with your weaker leg into the middle of the walker. Follow the weight bearing instructions your caregiver has given you.  · Push down with your hands and step forward with your stronger leg.  · Be careful not to let the walker get too far ahead of you as you walk.  · Repeat the process for each step.  · If your walker does not glide well over carpet, consider cutting an \"x\" in 2 old tennis balls and placing them over the back legs of your walker.  STANDING UP FROM A CHAIR WITH ARMRESTS  · It is best to sit in a firm chair with armrests.  · Position your walker directly in front of your chair. Do not pull on the walker when standing up. It is too unstable to support weight when pulled on.  · Slide forward in the chair, with your weaker leg ahead and stronger leg bent near the chair.  · Lean forward and push up from your chair with both hands on the armrests. Straighten your stronger leg, rising to standing. Do not pull yourself up from the walker. This may cause it to tip.  · When you feel steady on your feet, carefully move one hand at a time to the walker.  · Stand " for a few seconds to stabilize your balance before you start to walk.  STANDING UP FROM A CHAIR WITHOUT ARMRESTS  · It is best to sit in a firm chair. A low seat or an overstuffed chair or sofa is hard to get out of.  · Place the walker in front of you. Do not pull on the walker when coming to a standing position.  · Slide forward in the chair, with your weaker leg ahead and stronger leg bent near the chair.  · Push down on the chair seat with the hand opposite your weaker leg. Keep your other hand on the center of the walker's crossbar.  · Stand, steady your balance, and place your hands on the walker handgrips.  SITTING DOWN  · Always back up toward your chair, using your walker, until you feel the back of your legs touch the chair.  · If the chair has armrests, carefully reach back to put your hands on the armrests, and slowly lower your weight.  · If the chair does not have armrests, consider backing up to the side of the chair. You can then hold onto the back of the chair and the front of the seat to slowly lower yourself.  · You should never feel like you are falling into your chair.  USING A WALKER ON STEPS  ·  Before attempting to use your walker on steps, practice with your physical therapist.  · If you are going up a step wide enough to accommodate the entire walker and yourself:    First, place the walker up on the step.    Second, get your feet as close to the step as you can.    Third, press down on the walker with your hands as you step up with your stronger leg. Then step up with your weaker leg.  · If you are going down a step wide enough to accommodate the entire walker and yourself:    First, place the walker down on the step.    Second, hold onto the walker as you step down with your weaker leg. Then step down with your stronger leg.  · If you are going up more than 1 step and have a railing:    First, turn the walker sideways, so the opening is facing in toward you.    Second, place the front 2  legs of the walker on the first step. These front legs should be positioned at the base of the next step.    Third, test the steadiness of the walker. It should feel sturdy when you press down on the handgrip that is facing the top of the steps.    Finally, placing your weight on the railing and the walker, step up with your stronger leg first. Then step up with your weaker leg.  · If you are going down more than 1 step and have a railing:    First, turn the walker sideways, so the opening is facing in toward you.    Second, place the front 2 legs of the walker down on the first step. When possible, the back legs of the walker should be positioned at the base of the previous step.    Third, test the steadiness of the walker. It should feel sturdy when you press down on the handgrip that is facing the top of the steps.    Finally, placing your weight on the railing and the walker, step down with your weaker leg first. Then step down with your stronger leg.  · Be sure to check the sturdiness of the walker before each step.  · Make sure you have good rubber tips on the legs of your walker to prevent it from slipping.     This information is not intended to replace advice given to you by your health care provider. Make sure you discuss any questions you have with your health care provider.     Document Released: 12/18/2006 Document Revised: 03/11/2013 Document Reviewed: 07/01/2016  [employer] Interactive Patient Education ©2016 [employer]          Cryotherapy  Cryotherapy means treatment with cold. Ice or gel packs can be used to reduce both pain and swelling. Ice is the most helpful within the first 24 to 48 hours after an injury or flare-up from overusing a muscle or joint. Sprains, strains, spasms, burning pain, shooting pain, and aches can all be eased with ice. Ice can also be used when recovering from surgery. Ice is effective, has very few side effects, and is safe for most people to use.  PRECAUTIONS   Ice is  not a safe treatment option for people with:  · Raynaud phenomenon. This is a condition affecting small blood vessels in the extremities. Exposure to cold may cause your problems to return.  · Cold hypersensitivity. There are many forms of cold hypersensitivity, including:    Cold urticaria. Red, itchy hives appear on the skin when the tissues begin to warm after being iced.    Cold erythema. This is a red, itchy rash caused by exposure to cold.    Cold hemoglobinuria. Red blood cells break down when the tissues begin to warm after being iced. The hemoglobin that carry oxygen are passed into the urine because they cannot combine with blood proteins fast enough.  · Numbness or altered sensitivity in the area being iced.  If you have any of the following conditions, do not use ice until you have discussed cryotherapy with your caregiver:  · Heart conditions, such as arrhythmia, angina, or chronic heart disease.  · High blood pressure.  · Healing wounds or open skin in the area being iced.  · Current infections.  · Rheumatoid arthritis.  · Poor circulation.  · Diabetes.  Ice slows the blood flow in the region it is applied. This is beneficial when trying to stop inflamed tissues from spreading irritating chemicals to surrounding tissues. However, if you expose your skin to cold temperatures for too long or without the proper protection, you can damage your skin or nerves. Watch for signs of skin damage due to cold.  HOME CARE INSTRUCTIONS  Follow these tips to use ice and cold packs safely.  · Place a dry or damp towel between the ice and skin. A damp towel will cool the skin more quickly, so you may need to shorten the time that the ice is used.  · For a more rapid response, add gentle compression to the ice.  · Ice for no more than 10 to 20 minutes at a time. The bonier the area you are icing, the less time it will take to get the benefits of ice.  · Check your skin after 5 minutes to make sure there are no signs of  a poor response to cold or skin damage.  · Rest 20 minutes or more between uses.  · Once your skin is numb, you can end your treatment. You can test numbness by very lightly touching your skin. The touch should be so light that you do not see the skin dimple from the pressure of your fingertip. When using ice, most people will feel these normal sensations in this order: cold, burning, aching, and numbness.  · Do not use ice on someone who cannot communicate their responses to pain, such as small children or people with dementia.  HOW TO MAKE AN ICE PACK  Ice packs are the most common way to use ice therapy. Other methods include ice massage, ice baths, and cryosprays. Muscle creams that cause a cold, tingly feeling do not offer the same benefits that ice offers and should not be used as a substitute unless recommended by your caregiver.  To make an ice pack, do one of the following:  · Place crushed ice or a bag of frozen vegetables in a sealable plastic bag. Squeeze out the excess air. Place this bag inside another plastic bag. Slide the bag into a pillowcase or place a damp towel between your skin and the bag.  · Mix 3 parts water with 1 part rubbing alcohol. Freeze the mixture in a sealable plastic bag. When you remove the mixture from the freezer, it will be slushy. Squeeze out the excess air. Place this bag inside another plastic bag. Slide the bag into a pillowcase or place a damp towel between your skin and the bag.  SEEK MEDICAL CARE IF:  · You develop white spots on your skin. This may give the skin a blotchy (mottled) appearance.  · Your skin turns blue or pale.  · Your skin becomes waxy or hard.  · Your swelling gets worse.  MAKE SURE YOU:   · Understand these instructions.  · Will watch your condition.  · Will get help right away if you are not doing well or get worse.     This information is not intended to replace advice given to you by your health care provider. Make sure you discuss any questions you  have with your health care provider.     Document Released: 08/13/2012 Document Revised: 01/08/2016 Document Reviewed: 08/13/2012  [employer] Interactive Patient Education ©2016 [employer]          How to Use Compression Stockings  Compression stockings are elastic socks that squeeze the legs. They help to increase blood flow to the legs, decrease swelling in the legs, and reduce the chance of developing blood clots in the lower legs. Compression stockings are often used by people who:  · Are recovering from surgery.  · Have poor circulation in their legs.  · Are prone to getting blood clots in their legs.  · Have varicose veins.  · Sit or stay in bed for long periods of time.  HOW TO USE COMPRESSION STOCKINGS  Before you put on your compression stockings:  · Make sure that they are the correct size. If you do not know your size, ask your health care provider.  · Make sure that they are clean, dry, and in good condition.  · Check them for rips and tears. Do not put them on if they are ripped or torn.  Put your stockings on first thing in the morning, before you get out of bed. Keep them on for as long as your health care provider advises. When you are wearing your stockings:  · Keep them as smooth as possible. Do not allow them to bunch up. It is especially important to prevent the stockings from bunching up around your toes or behind your knees.  · Do not roll the stockings downward and leave them rolled down. This can decrease blood flow to your leg.  · Change them right away if they become wet or dirty.  When you take off your stockings, inspect your legs and feet. Anything that does not seem normal may require medical attention. Look for:  · Open sores.  · Red spots.  · Swelling.  INFORMATION AND TIPS  · Do not stop wearing your compression stockings without talking to your health care provider first.  · Wash your stockings everyday with mild detergent in cold or warm water. Do not use bleach. Air-dry your  stockings or dry them in a clothes dryer on low heat.  · Replace your stockings every 3-6 months.  · If skin moisturizing is part of your treatment plan, apply lotion or cream at night so that your skin will be dry when you put on the stockings in the morning. It is harder to put the stockings on when you have lotion on your legs or feet.  SEEK MEDICAL CARE IF:  Remove your stockings and seek medical care if:  · You have a feeling of pins and needles in your feet or legs.  · You have any new changes in your skin.  · You have skin lesions that are getting worse.  · You have swelling or pain that is getting worse.  SEEK IMMEDIATE MEDICAL CARE IF:  · You have numbness or tingling in your lower legs that does not get better immediately after you take the stockings off.  · Your toes or feet become cold and blue.  · You develop open sores or red spots on your legs that do not go away.  · You see or feel a warm spot on your leg.  · You have new swelling or soreness in your leg.  · You are short of breath or you have chest pain for no reason.  · You have a rapid or irregular heartbeat.  · You feel light-headed or dizzy.     This information is not intended to replace advice given to you by your health care provider. Make sure you discuss any questions you have with your health care provider.     Document Released: 10/15/2010 Document Revised: 05/03/2016 Document Reviewed: 11/25/2015  [employer] Interactive Patient Education ©2016 [employer]         PREVENTING SURGICAL SITE INFECTIONS     Surgical Site Infections FAQs  What is a Surgical Site Infection (SSI)?  A surgical site infection is an infection that occurs after surgery in the part of the body where the surgery took place. Most patients who have surgery do not develop an infection. However, infections develop in about 1 to 3 out of every 100 patients who have surgery.  Some of the common symptoms of a surgical site infection are:  · Redness and pain around the area  where you had surgery  · Drainage of cloudy fluid from your surgical wound  · Fever  Can SSIs be treated?  Yes. Most surgical site infections can be treated with antibiotics. The antibiotic given to you depends on the bacteria (germs) causing the infection. Sometimes patients with SSIs also need another surgery to treat the infection.  What are some of the things that hospitals are doing to prevent SSIs?  To prevent SSIs, doctors, nurses, and other healthcare providers:  · Clean their hands and arms up to their elbows with an antiseptic agent just before the surgery.  · Clean their hands with soap and water or an alcohol-based hand rub before and after caring for each patient.  · May remove some of your hair immediately before your surgery using electric clippers if the hair is in the same area where the procedure will occur. They should not shave you with a razor.  · Wear special hair covers, masks, gowns, and gloves during surgery to keep the surgery area clean.  · Give you antibiotics before your surgery starts. In most cases, you should get antibiotics within 60 minutes before the surgery starts and the antibiotics should be stopped within 24 hours after surgery.  · Clean the skin at the site of your surgery with a special soap that kills germs.  What can I do to help prevent SSIs?  Before your surgery:  · Tell your doctor about other medical problems you may have. Health problems such as allergies, diabetes, and obesity could affect your surgery and your treatment.  · Quit smoking. Patients who smoke get more infections. Talk to your doctor about how you can quit before your surgery.  · Do not shave near where you will have surgery. Shaving with a razor can irritate your skin and make it easier to develop an infection.  At the time of your surgery:  · Speak up if someone tries to shave you with a razor before surgery. Ask why you need to be shaved and talk with your surgeon if you have any concerns.  · Ask if  you will get antibiotics before surgery.  After your surgery:  · Make sure that your healthcare providers clean their hands before examining you, either with soap and water or an alcohol-based hand rub.    If you do not see your providers clean their hands, please ask them to do so.  · Family and friends who visit you should not touch the surgical wound or dressings.  · Family and friends should clean their hands with soap and water or an alcohol-based hand rub before and after visiting you. If you do not see them clean their hands, ask them to clean their hands.  What do I need to do when I go home from the hospital?  · Before you go home, your doctor or nurse should explain everything you need to know about taking care of your wound. Make sure you understand how to care for your wound before you leave the hospital.  · Always clean your hands before and after caring for your wound.  · Before you go home, make sure you know who to contact if you have questions or problems after you get home.  · If you have any symptoms of an infection, such as redness and pain at the surgery site, drainage, or fever, call your doctor immediately.  If you have additional questions, please ask your doctor or nurse.  Developed and co-sponsored by The Society for Healthcare Epidemiology of [place] (SHEA); Infectious Diseases Society of [place] (IDSA); American Hospital Association; Association for Professionals in Infection Control and Epidemiology (APIC); Centers for Disease Control and Prevention (CDC); and The Joint Commission.     This information is not intended to replace advice given to you by your health care provider. Make sure you discuss any questions you have with your health care provider.     Document Released: 12/23/2014 Document Revised: 01/08/2016 Document Reviewed: 03/02/2016  [employer] Interactive Patient Education ©2016 [employer]             SYMPTOMS OF A STROKE    Call 911 or have someone take you to the  Emergency Department if you have any of the following:    · Sudden numbness or weakness of your face, arm or leg especially on one side of the body  · Sudden confusion, diffiiculty speaking or trouble understanding   · Changes in your vision or loss of sight in one eye  · Sudden severe headache with no known cause  · sudden dizziness, trouble walking, loss of balance or coordination    It is important to seek emergency care right away if you have further stroke symptoms. If you get emergency help quickly, the powerful clot-dissolving medicines can reduce the disabilities caused by a stroke.     For more information:    American Stroke Association  6-377-1-STROKE  www.strokeassociation.org           IF YOU SMOKE OR USE TOBACCO PLEASE READ THE FOLLOWING:    Why is smoking bad for me?  Smoking increases the risk of heart disease, lung disease, vascular disease, stroke, and cancer.     If you smoke, STOP!    If you would like more information on quitting smoking, please visit the [employer] website: [email or web address]   This link will provide additional resources including the QUIT line and the Beat the Pack support groups.     For more information:    American Cancer Society  ([phone]    American Heart Association  1-845.137.6366               YOU ARE THE MOST IMPORTANT FACTOR IN YOUR RECOVERY.     Follow all instructions carefully.     I have reviewed my discharge instructions with my nurse, including the following information, if applicable:     Information about my illness and diagnosis   Follow up appointments (including lab draws)   Wound Care   Equipment Needs   Medications (new and continuing) along with side effects   Preventative information such as vaccines and smoking cessations   Diet   Pain   I know when to contact my Doctor's office or seek emergency care      I want my nurse to describe the side effects of my medications: YES NO   If the answer is  no, I understand the side effects of my medications: YES NO   My nurse described the side effects of my medications in a way that I could understand: YES NO   I have taken my personal belongings and my own medications with me at discharge: YES NO            I have received this information and my questions have been answered. I have discussed any concerns I see with this plan with the nurse or physician. I understand these instructions.    Signature of Patient or Responsible Person: _____________________________________    Date: _________________  Time: __________________    Signature of Healthcare Provider: _______________________________________  Date: _________________  Time: __________________

## 2017-01-07 NOTE — PLAN OF CARE
Problem: Patient Care Overview (Adult)  Goal: Plan of Care Review  Outcome: Ongoing (interventions implemented as appropriate)    01/07/17 1830   Coping/Psychosocial Response Interventions   Plan Of Care Reviewed With patient   Patient Care Overview   Progress no change   Outcome Evaluation   Outcome Summary/Follow up Plan patient still having a lot of pain issues, vs stable, disappointed no surgery until Monday, slightly anxious, ready to get surgery overwith       Goal: Adult Individualization and Mutuality  Outcome: Ongoing (interventions implemented as appropriate)    Problem: Fractured Hip (Adult)  Goal: Signs and Symptoms of Listed Potential Problems Will be Absent or Manageable (Fractured Hip)  Outcome: Ongoing (interventions implemented as appropriate)    Problem: Fall Risk (Adult)  Goal: Identify Related Risk Factors and Signs and Symptoms  Outcome: Outcome(s) achieved Date Met:  01/07/17  Goal: Absence of Falls  Outcome: Ongoing (interventions implemented as appropriate)    Problem: Pressure Ulcer Risk (Wayne Scale) (Adult,Obstetrics,Pediatric)  Goal: Identify Related Risk Factors and Signs and Symptoms  Outcome: Outcome(s) achieved Date Met:  01/07/17  Goal: Skin Integrity  Outcome: Ongoing (interventions implemented as appropriate)

## 2017-01-07 NOTE — H&P
CHIEF COMPLAINT: Left hip pain.     HISTORY OF PRESENT ILLNESS: This is a 61-year-old woman who is unknown to our service. Last night she was in the driveway and slipped on some black ice. Her legs went completely out from under her, and she landed on the left hip. It was painful, but she was able to get up and walk around. At 3 a.m. the pain was even worse. Her son came to check on her, and she had to get up to unlock the door to let him in. She was able to do it, but when she got back to bed she was sweating and felt queasy. A little after that, her brother came, and she again got up to let him in. As she was walking, she heard something pop, and then her leg gave out from under her. She was not able to bear weight after that. The pain is severe. It is along the anterior left thigh. She had some numbness of the left foot last night but not now. She did just get some IV pain medications and that has helped. No other associated symptoms. No exacerbating or alleviating factors otherwise.     PAST MEDICAL HISTORY:   1. In 2013 she had partial colectomy for ruptured diverticula.   2. Left toe fracture. She had a bone density test about 10 years ago, and it was fine.   3. ADD.     HOME MEDICATIONS:  1. Adderall 10 mg daily.   2. Excedrin Migraine p.r.n.   3. B complex vitamin daily.   4. Multivitamin daily.   5. Omega-3 take 2000 mg daily.     ALLERGIES: PENICILLIN (sensitivity).      SOCIAL HISTORY: She is  and lives alone. She works as an  for [employer]. Her job is mostly sedentary. She smokes a half pack a day and has 4 beers daily.     FAMILY HISTORY: Negative for osteoporosis. Positive for heart disease and hypertension.     REVIEW OF SYSTEMS: No sore throat. No earache. No vision changes. No hearing changes. She has occasional hot flashes and night sweats. She feels very hot right now. No change in her hair texture or skin. No vision changes. No hearing changes. No chest pain. No  palpitations. No PND. No orthopnea. No ankle swelling. No cough or wheezing. No shortness of breath. She is fairly active. No palpitations. In general, no nausea, vomiting, diarrhea or constipation. No dysuria. No polyuria. No polydipsia. No rash or skin changes. Her weight has been stable. Her appetite is good. No dizziness or lightheadedness typically. She does have loose stools at times, sometimes twice a day. This started after the partial colectomy 3 years ago. The rest of the 10-point review of systems is otherwise negative.     PHYSICAL EXAMINATION:  GENERAL: She looks a little uncomfortable but in no acute distress. Alert, cooperative and appropriate. She looks her stated age, neatly groomed, well-developed. Her face is flushed.   VITAL SIGNS: Temperature 98.2, pulse 71, respirations 18, blood pressure 131/97, weight 130 pounds, O2 saturation 93% on room air.   HEENT: Normocephalic, atraumatic. Her face is flushed as noted above. Pupils equal, round and reactive to light and accommodation. No nystagmus. Extraocular movements intact. Lids and conjunctivae normal without ptosis or icterus. Oropharynx clear. No thrush. No masses or lesions. Dentition intact. External ears and nose are normal on inspection. Hearing is intact.   NECK: Supple without lymphadenopathy, thyromegaly, JVD or bruit. Trachea midline.   HEART: Regular without murmur, rub or gallop. Normal S1, S2.   LUNGS: Clear. Breath sounds equal. No usage of accessory muscles of respiration. No wheezing or crackles.   ABDOMEN: Soft, nontender. Positive bowel sounds. No hepatosplenomegaly, rebound, guarding or mass.   EXTREMITIES: No clubbing, cyanosis or edema. Her palms and dorsal aspects of the hands are flushed. She did have slight tremor of her fingers with both hands outstretched.   VASCULAR: Pedal, radial and carotid pulses easily palpable and symmetric.   SKIN: No rash, ulceration or nodule.   NEUROLOGIC: Cranial nerves intact. Muscle strength:  Upper extremities intact to gravity. Lower extremities not tested secondary to fracture.     DIAGNOSTIC DATA: Glucose 111, BUN 14, creatinine 0.6, sodium 140, potassium 4, chloride 101, CO2 of 23, albumin 4.4, alkaline phosphatase elevated at 118, LFTs otherwise normal. INR 1. White count 12.8, hemoglobin 13.5, platelets 265,000, with 81 segs, 10 lymphs, MCV up at 99. TSH pending. Plain films show acute left subcapital femoral neck fracture, varus angulation. Note that underlying bone lesion is a possibility. Please see report for complete details. Plain film of the chest: No infiltrate or effusion. I have reviewed her plain films. EKG on my review: Sinus with a rate of about 75. Incomplete right bundle. No previous tracings.     IMPRESSION/PLAN:  1. Left subcapital femur fracture. Orthopedics has been consulted through the ER. She is at acceptable risk and ready for surgery when OR time is available. _____ her abnormal EKG. There are no acute findings, and she has no clinical symptoms of heart failure or ischemia. She will need an echocardiogram which we can do during her stay here.   2. Excess alcohol usage. I discussed this with the patient. We will watch for any signs of withdrawal. I have ordered a TSH to make sure she does not have any overactive thyroid.   3. Slight tremor as noted above. No evidence of alcohol withdrawal. This could be secondary to the use of Adderall. We will hold that for now.   4. Cigarette smoking without any symptoms or signs of COPD. She has been advised to quit.   5. Leukocytosis, likely reactive.   6. History of partial colectomy for ruptured diverticula in 2013.         Latasha Faulkner M.D.  MERLENE:bhavya  D:   01/07/2017 15:02:49  T:   01/07/2017 15:34:45  Job ID:   [id number]  Document ID:   [id number]  cc:

## 2017-01-08 ENCOUNTER — APPOINTMENT (OUTPATIENT)
Dept: CARDIOLOGY | Facility: HOSPITAL | Age: 62
End: 2017-01-08
Attending: INTERNAL MEDICINE

## 2017-01-08 PROBLEM — D72.829 LEUKOCYTOSIS: Status: ACTIVE | Noted: 2017-01-08

## 2017-01-08 PROBLEM — F10.20 UNCOMPLICATED ALCOHOL DEPENDENCE: Status: ACTIVE | Noted: 2017-01-08

## 2017-01-08 PROBLEM — R73.9 HYPERGLYCEMIA: Status: ACTIVE | Noted: 2017-01-08

## 2017-01-08 PROBLEM — Z72.0 TOBACCO ABUSE: Status: ACTIVE | Noted: 2017-01-08

## 2017-01-08 PROBLEM — R94.31 EKG ABNORMALITY: Status: ACTIVE | Noted: 2017-01-08

## 2017-01-08 LAB
ANION GAP SERPL CALCULATED.3IONS-SCNC: 14.3 MMOL/L
BUN BLD-MCNC: 9 MG/DL (ref 6–20)
BUN/CREAT SERPL: 17 (ref 7–25)
CALCIUM SPEC-SCNC: 9.3 MG/DL (ref 8.6–10.5)
CHLORIDE SERPL-SCNC: 98 MMOL/L (ref 98–107)
CO2 SERPL-SCNC: 22.7 MMOL/L (ref 22–29)
CREAT BLD-MCNC: 0.53 MG/DL (ref 0.57–1)
DEPRECATED RDW RBC AUTO: 47.9 FL (ref 37–54)
ERYTHROCYTE [DISTWIDTH] IN BLOOD BY AUTOMATED COUNT: 13 % (ref 11.7–13)
GFR SERPL CREATININE-BSD FRML MDRD: 118 ML/MIN/1.73
GLUCOSE BLD-MCNC: 113 MG/DL (ref 65–99)
HCT VFR BLD AUTO: 40 % (ref 35.6–45.5)
HGB BLD-MCNC: 13.1 G/DL (ref 11.9–15.5)
MCH RBC QN AUTO: 32.9 PG (ref 26.9–32)
MCHC RBC AUTO-ENTMCNC: 32.8 G/DL (ref 32.4–36.3)
MCV RBC AUTO: 100.5 FL (ref 80.5–98.2)
PLATELET # BLD AUTO: 210 10*3/MM3 (ref 140–500)
PMV BLD AUTO: 9.9 FL (ref 6–12)
POTASSIUM BLD-SCNC: 3.7 MMOL/L (ref 3.5–5.2)
RBC # BLD AUTO: 3.98 10*6/MM3 (ref 3.9–5.2)
SODIUM BLD-SCNC: 135 MMOL/L (ref 136–145)
WBC NRBC COR # BLD: 13.59 10*3/MM3 (ref 4.5–10.7)

## 2017-01-08 PROCEDURE — 93306 TTE W/DOPPLER COMPLETE: CPT | Performed by: INTERNAL MEDICINE

## 2017-01-08 PROCEDURE — 85027 COMPLETE CBC AUTOMATED: CPT | Performed by: INTERNAL MEDICINE

## 2017-01-08 PROCEDURE — 80048 BASIC METABOLIC PNL TOTAL CA: CPT | Performed by: INTERNAL MEDICINE

## 2017-01-08 PROCEDURE — 99253 IP/OBS CNSLTJ NEW/EST LOW 45: CPT | Performed by: ORTHOPAEDIC SURGERY

## 2017-01-08 PROCEDURE — 93306 TTE W/DOPPLER COMPLETE: CPT

## 2017-01-08 PROCEDURE — 25010000002 HYDROMORPHONE PER 4 MG: Performed by: INTERNAL MEDICINE

## 2017-01-08 RX ORDER — HYDROMORPHONE HYDROCHLORIDE 1 MG/ML
0.5 INJECTION, SOLUTION INTRAMUSCULAR; INTRAVENOUS; SUBCUTANEOUS
Status: DISCONTINUED | OUTPATIENT
Start: 2017-01-08 | End: 2017-01-09 | Stop reason: HOSPADM

## 2017-01-08 RX ADMIN — HYDROMORPHONE HYDROCHLORIDE 0.5 MG: 1 INJECTION, SOLUTION INTRAMUSCULAR; INTRAVENOUS; SUBCUTANEOUS at 04:56

## 2017-01-08 RX ADMIN — HYDROMORPHONE HYDROCHLORIDE 0.5 MG: 1 INJECTION, SOLUTION INTRAMUSCULAR; INTRAVENOUS; SUBCUTANEOUS at 17:11

## 2017-01-08 RX ADMIN — HYDROMORPHONE HYDROCHLORIDE 0.5 MG: 1 INJECTION, SOLUTION INTRAMUSCULAR; INTRAVENOUS; SUBCUTANEOUS at 09:17

## 2017-01-08 RX ADMIN — HYDROMORPHONE HYDROCHLORIDE 0.5 MG: 1 INJECTION, SOLUTION INTRAMUSCULAR; INTRAVENOUS; SUBCUTANEOUS at 21:34

## 2017-01-08 RX ADMIN — HYDROMORPHONE HYDROCHLORIDE 0.5 MG: 1 INJECTION, SOLUTION INTRAMUSCULAR; INTRAVENOUS; SUBCUTANEOUS at 02:40

## 2017-01-08 RX ADMIN — DIAZEPAM 5 MG: 5 TABLET ORAL at 14:29

## 2017-01-08 RX ADMIN — SODIUM CHLORIDE 75 ML/HR: 9 INJECTION, SOLUTION INTRAVENOUS at 07:21

## 2017-01-08 RX ADMIN — DIAZEPAM 5 MG: 5 TABLET ORAL at 21:03

## 2017-01-08 RX ADMIN — HYDROMORPHONE HYDROCHLORIDE 0.5 MG: 1 INJECTION, SOLUTION INTRAMUSCULAR; INTRAVENOUS; SUBCUTANEOUS at 11:09

## 2017-01-08 RX ADMIN — HYDROMORPHONE HYDROCHLORIDE 0.5 MG: 1 INJECTION, SOLUTION INTRAMUSCULAR; INTRAVENOUS; SUBCUTANEOUS at 07:22

## 2017-01-08 RX ADMIN — Medication 5 MG: at 21:03

## 2017-01-08 RX ADMIN — HYDROMORPHONE HYDROCHLORIDE 0.5 MG: 1 INJECTION, SOLUTION INTRAMUSCULAR; INTRAVENOUS; SUBCUTANEOUS at 23:45

## 2017-01-08 RX ADMIN — DIAZEPAM 5 MG: 5 TABLET ORAL at 08:22

## 2017-01-08 RX ADMIN — DIAZEPAM 5 MG: 5 TABLET ORAL at 02:40

## 2017-01-08 RX ADMIN — HYDROMORPHONE HYDROCHLORIDE 0.5 MG: 1 INJECTION, SOLUTION INTRAMUSCULAR; INTRAVENOUS; SUBCUTANEOUS at 15:23

## 2017-01-08 RX ADMIN — SODIUM CHLORIDE 75 ML/HR: 9 INJECTION, SOLUTION INTRAVENOUS at 23:52

## 2017-01-08 RX ADMIN — HYDROMORPHONE HYDROCHLORIDE 0.5 MG: 1 INJECTION, SOLUTION INTRAMUSCULAR; INTRAVENOUS; SUBCUTANEOUS at 13:21

## 2017-01-08 NOTE — PROGRESS NOTES
" LOS: 1 day   Primary Care Physician: RODOLFO Doyle MD     Subjective  Pain is been back in today.  Just got back from echo.  No shortness of breath.  No dizziness.  Eating dinner now    Vital Signs  Body mass index is 20.98 kg/(m^2).  Temp:  [97.2 °F (36.2 °C)-99.1 °F (37.3 °C)] 99.1 °F (37.3 °C)  Heart Rate:  [74-93] 93  Resp:  [16-18] 16  BP: (120-154)/(70-88) 124/74      Objective:  General Appearance:  In no acute distress.    Vital signs: (most recent): Blood pressure 124/74, pulse 93, temperature 99.1 °F (37.3 °C), temperature source Oral, resp. rate 16, height 66\" (167.6 cm), weight 130 lb (59 kg), SpO2 91 %.    Lungs:  There are decreased breath sounds.  No wheezes, rales or rhonchi.    Heart: Normal rate.  Regular rhythm.  No murmur.   Abdomen: Abdomen is soft and non-distended.  There is no abdominal tenderness.   There is no splenomegaly. There is no hepatomegaly.   Extremities: There is no dependent edema.    Neurological: Patient is alert.          Results Review:    I reviewed the patient's new clinical results.      Results from last 7 days  Lab Units 01/08/17  0423 01/07/17  1313   WBC 10*3/mm3 13.59* 12.84*   HEMOGLOBIN g/dL 13.1 13.5   PLATELETS 10*3/mm3 210 265       Results from last 7 days  Lab Units 01/08/17  0423 01/07/17  1313   SODIUM mmol/L 135* 140   POTASSIUM mmol/L 3.7 4.0   CHLORIDE mmol/L 98 101   TOTAL CO2 mmol/L 22.7 23.0   BUN mg/dL 9 14   CREATININE mg/dL 0.53* 0.55*   CALCIUM mg/dL 9.3 9.3   GLUCOSE mg/dL 113* 111*       Results from last 7 days  Lab Units 01/07/17  1313   INR  0.99     Hemoglobin A1C:No results found for: HGBA1C    Glucose Range:No results found for: POCGLU    Medication Review: Yes    Physical Therapy:    Assessment/Plan     Active Hospital Problems (** Indicates Principal Problem)    Diagnosis Date Noted   • **Closed fracture of neck of left femur [S72.002A] 01/07/2017   • EKG abnormality [R94.31] 01/08/2017   • Hyperglycemia [R73.9] 01/08/2017   • " Uncomplicated alcohol dependence [F10.20] 01/08/2017   • Tobacco abuse [Z72.0] 01/08/2017   • Leukocytosis [D72.829] 01/08/2017      Resolved Hospital Problems    Diagnosis Date Noted Date Resolved   No resolved problems to display.       Assessment & Plan  1.  Left subcapital femur fracture with dislocation.  For hip replacement tomorrow.  Continue pain medications as needed  2.  Abnormal EKG.  No symptoms or signs of heart failure or ischemia.  Echo done today.  Acceptable risk to proceed tomorrow with surgery.  3.  Alcohol dependence.  No sign of withdrawal  4.  Leukocytosis, probably reactive.  Mild  5.  Hyperglycemia.  Will check hemoglobin A1c.  6. Cigarette smoking      Latasha Faulkner MD  01/08/17  5:36 PM

## 2017-01-08 NOTE — PLAN OF CARE
Problem: Patient Care Overview (Adult)  Goal: Plan of Care Review  Outcome: Ongoing (interventions implemented as appropriate)    01/08/17 4491   Coping/Psychosocial Response Interventions   Plan Of Care Reviewed With patient   Patient Care Overview   Progress improving   Outcome Evaluation   Outcome Summary/Follow up Plan VS stable, pain better but still high, anxious to get surgery over with, npo at midnight       Goal: Adult Individualization and Mutuality  Outcome: Ongoing (interventions implemented as appropriate)    Problem: Fractured Hip (Adult)  Goal: Signs and Symptoms of Listed Potential Problems Will be Absent or Manageable (Fractured Hip)  Outcome: Ongoing (interventions implemented as appropriate)    Problem: Fall Risk (Adult)  Goal: Absence of Falls  Outcome: Ongoing (interventions implemented as appropriate)    Problem: Pressure Ulcer Risk (Wayne Scale) (Adult,Obstetrics,Pediatric)  Goal: Skin Integrity  Outcome: Ongoing (interventions implemented as appropriate)

## 2017-01-08 NOTE — CONSULTS
Orthopedic Consult      Patient: Chantell Knight    Date of Admission: 1/7/2017 11:19 AM    YOB: 1955    Medical Record Number: [id number]    Consulting Physician: Latasha Faulkner MD    Chief Complaints: Left femoral neck fracture    History of Present Illness: 61 y.o. female admitted to [facility] to services of Latasha Faulkner MD with a displaced femoral neck fracture. Patient reports that injury was sustained in a fall. Landed awkwardly on the affected hip. Noticed immediate pain and inability to bear weight on the leg. Was brought to the ER and diagnosed with a displaced femoral neck fracture. Localizes the pain to the groin. Describes pain as severe, constant, worse with any movement but alleviated by rest. Denies LOC. Denies any other associated injury or complaint    Allergies:   Allergies   Allergen Reactions   • Penicillins Diarrhea       Medications:   Home Medications:    Current Facility-Administered Medications:   •  acetaminophen (TYLENOL) tablet 650 mg, 650 mg, Oral, Q4H PRN, Latasha Faulkner MD  •  aspirin-acetaminophen-caffeine (EXCEDRIN MIGRAINE) per tablet 1 tablet, 1 tablet, Oral, Q6H PRN, Latasha Faulkner MD  •  b complex-C-folic acid capsule 1 mg, 1 capsule, Oral, Daily, Latasha Faulkner MD, 1 mg at 01/07/17 1749  •  cyclobenzaprine (FLEXERIL) tablet 10 mg, 10 mg, Oral, TID PRN, Fausto Horvath MD, 10 mg at 01/07/17 1748  •  diazePAM (VALIUM) tablet 5 mg, 5 mg, Oral, Q6H PRN, Fausto Horvath MD, 5 mg at 01/08/17 0240  •  diphenhydrAMINE (BENADRYL) capsule 25 mg, 25 mg, Oral, Q6H PRN, Fausto Horvath MD, 25 mg at 01/07/17 1748  •  HYDROcodone-acetaminophen (NORCO) 7.5-325 MG per tablet 1 tablet, 1 tablet, Oral, Q4H PRN, Latasha Faulkner MD, 1 tablet at 01/07/17 1637  •  HYDROcodone-acetaminophen (NORCO) 7.5-325 MG per tablet 2 tablet, 2 tablet, Oral, Q4H PRN, Latasha Faulkner MD  •  HYDROmorphone (DILAUDID) injection 0.5 mg, 0.5 mg, Intravenous, Q2H PRN, Kenny Santoyo  MD Jose Daniel, 0.5 mg at 01/08/17 0722  •  melatonin tablet 5 mg, 5 mg, Oral, Nightly, Latasha Faulkner MD, 5 mg at 01/07/17 2240  •  multivitamin with minerals 1 tablet, 1 tablet, Oral, Daily, Latasha Faulkner MD, 1 tablet at 01/07/17 1749  •  [DISCONTINUED] morphine injection 1 mg, 1 mg, Intravenous, Q4H PRN **AND** naloxone (NARCAN) injection 0.4 mg, 0.4 mg, Intravenous, Q5 Min PRN, Latasha Faulkner MD  •  [DISCONTINUED] morphine injection 1 mg, 1 mg, Intravenous, Q4H PRN, 1 mg at 01/07/17 1637 **AND** naloxone (NARCAN) injection 0.4 mg, 0.4 mg, Intravenous, Q5 Min PRN, Latasha Faulkner MD  •  ondansetron (ZOFRAN) injection 4 mg, 4 mg, Intravenous, Q6H PRN, Latasha Faulkner MD  •  sodium chloride 0.9 % flush 1-10 mL, 1-10 mL, Intravenous, PRN, Latasha Faulkner MD  •  sodium chloride 0.9 % infusion, 75 mL/hr, Intravenous, Continuous, Latasha Faulkner MD, Last Rate: 75 mL/hr at 01/08/17 0721, 75 mL/hr at 01/08/17 0721    Current Medications:  Scheduled Meds:  b complex-C-folic acid 1 capsule Oral Daily   melatonin 5 mg Oral Nightly   multivitamin with minerals 1 tablet Oral Daily     Continuous Infusions:  sodium chloride 75 mL/hr Last Rate: 75 mL/hr (01/08/17 0721)     PRN Meds:.•  acetaminophen  •  aspirin-acetaminophen-caffeine  •  cyclobenzaprine  •  diazePAM  •  diphenhydrAMINE  •  HYDROcodone-acetaminophen  •  HYDROcodone-acetaminophen  •  HYDROmorphone  •  [DISCONTINUED] Morphine **AND** naloxone  •  [DISCONTINUED] Morphine **AND** naloxone  •  ondansetron  •  sodium chloride    History reviewed. No pertinent past medical history.    Past Surgical History   Procedure Laterality Date   • Colon resection left  06/2013     due to several ruptured diverticuli        Social History     Occupational History   • Not on file.     Social History Main Topics   • Smoking status: Current Every Day Smoker     Packs/day: 0.25     Types: Cigarettes   • Smokeless tobacco: Not on file   • Alcohol use 1.2 - 1.8 oz/week     2 - 3  Cans of beer per week      Comment: per day    • Drug use: No   • Sexual activity: Defer    Social History     Social History Narrative       History reviewed. No pertinent family history.    Review of Systems:     Constitutional:  Denies fever, shaking or chills   Eyes:  Denies change in visual acuity   HEENT:  Denies nasal congestion or sore throat   Respiratory:  Denies cough or shortness of breath   Cardiovascular:  Denies chest pain or edema  Endocrine: Denies tremors, palpitations, intolerance of heat or cold, polyuria, polydipsia.  GI:  Denies abdominal pain, nausea, vomiting, bloody stools or diarrhea  :  Denies frequency, urgency, incontinence, retention, or nocturia.  Musculoskeletal:  Denies numbness tingling or loss of motor function except as above  Integument:  Denies rash, lesion or ulceration   Neurologic:  Denies headache or focal weakness, deficits  Heme:  Denies epistaxis, spontaneous or excessive bleeding, epistaxis, hematuria, melena, fatigue, enlarged or tender lymph nodes.      All other pertinent positives and negatives as noted above in HPI.    Physical Exam: 61 y.o. female    General:  Awake, alert. No acute distress.      Head/Neck:  Normocephalic, atraumatic.  Conjunctiva and sclera clear.  Hearing adequate for the exam.  Neck is supple with normal ROM.    Psych:  Affect and demeanor appropriate.    CV:  Regular rate and rhythm.  Hemodynamically stable.    Lungs:  Good chest expansion, breathing unlabored.    Abdomen:  Soft.  Non-tender, non-distended.    Extremities:      Left lower extremity: Skin appears benign without obvious lacerations, ulcerations or lesions. Leg appears shortened and externally rotated. Compartments soft without evidence for DVT or compartment syndrome. No atrophy. No palpable masses or adenopathy. Focal TTP over hip. ROM of hip extremely limited and uncomfortable. No pain with passive motion of knee, ankle. Strength well-preserved distally. Sensation to light  touch grossly intact distally. Good skin turgor, brisk cap refill and good pulses distally.    All other extremities atraumatic without gross abnormality. Contralateral leg demonstrates no bony tenderness. Good hip, knee motion. No instability. Good motor and sensory function distally with brisk cap refill.    Diagnostic Tests:    Admission on 01/07/2017   Component Date Value Ref Range Status   • Glucose 01/07/2017 111* 65 - 99 mg/dL Final   • BUN 01/07/2017 14  6 - 20 mg/dL Final   • Creatinine 01/07/2017 0.55* 0.57 - 1.00 mg/dL Final   • Sodium 01/07/2017 140  136 - 145 mmol/L Final   • Potassium 01/07/2017 4.0  3.5 - 5.2 mmol/L Final   • Chloride 01/07/2017 101  98 - 107 mmol/L Final   • CO2 01/07/2017 23.0  22.0 - 29.0 mmol/L Final   • Calcium 01/07/2017 9.3  8.6 - 10.5 mg/dL Final   • Total Protein 01/07/2017 7.1  6.0 - 8.5 g/dL Final   • Albumin 01/07/2017 4.40  3.50 - 5.20 g/dL Final   • ALT (SGPT) 01/07/2017 23  1 - 33 U/L Final   • AST (SGOT) 01/07/2017 23  1 - 32 U/L Final   • Alkaline Phosphatase 01/07/2017 118* 39 - 117 U/L Final   • Total Bilirubin 01/07/2017 0.5  0.1 - 1.2 mg/dL Final   • eGFR Non African Amer 01/07/2017 113  >60 mL/min/1.73 Final   • Globulin 01/07/2017 2.7  gm/dL Final   • A/G Ratio 01/07/2017 1.6  g/dL Final   • BUN/Creatinine Ratio 01/07/2017 25.5* 7.0 - 25.0 Final   • Anion Gap 01/07/2017 16.0  mmol/L Final   • Protime 01/07/2017 12.7  11.7 - 14.2 Seconds Final   • INR 01/07/2017 0.99  0.90 - 1.10 Final   • ABO Type 01/07/2017 O   Final   • RH type 01/07/2017 Negative   Final   • Antibody Screen 01/07/2017 Negative   Final   • WBC 01/07/2017 12.84* 4.50 - 10.70 10*3/mm3 Final   • RBC 01/07/2017 4.09  3.90 - 5.20 10*6/mm3 Final   • Hemoglobin 01/07/2017 13.5  11.9 - 15.5 g/dL Final   • Hematocrit 01/07/2017 40.4  35.6 - 45.5 % Final   • MCV 01/07/2017 98.8* 80.5 - 98.2 fL Final   • MCH 01/07/2017 33.0* 26.9 - 32.0 pg Final   • MCHC 01/07/2017 33.4  32.4 - 36.3 g/dL Final   • RDW  01/07/2017 12.8  11.7 - 13.0 % Final   • RDW-SD 01/07/2017 46.3  37.0 - 54.0 fl Final   • MPV 01/07/2017 10.2  6.0 - 12.0 fL Final   • Platelets 01/07/2017 265  140 - 500 10*3/mm3 Final   • Neutrophil % 01/07/2017 81.4* 42.7 - 76.0 % Final   • Lymphocyte % 01/07/2017 10.4* 19.6 - 45.3 % Final   • Monocyte % 01/07/2017 6.7  5.0 - 12.0 % Final   • Eosinophil % 01/07/2017 1.0  0.3 - 6.2 % Final   • Basophil % 01/07/2017 0.3  0.0 - 1.5 % Final   • Immature Grans % 01/07/2017 0.2  0.0 - 0.5 % Final   • Neutrophils, Absolute 01/07/2017 10.46* 1.90 - 8.10 10*3/mm3 Final   • Lymphocytes, Absolute 01/07/2017 1.33  0.90 - 4.80 10*3/mm3 Final   • Monocytes, Absolute 01/07/2017 0.86  0.20 - 1.20 10*3/mm3 Final   • Eosinophils, Absolute 01/07/2017 0.13  0.00 - 0.70 10*3/mm3 Final   • Basophils, Absolute 01/07/2017 0.04  0.00 - 0.20 10*3/mm3 Final   • Immature Grans, Absolute 01/07/2017 0.02  0.00 - 0.03 10*3/mm3 Final   • TSH 01/07/2017 3.690  0.270 - 4.200 mIU/mL Final   • WBC 01/08/2017 13.59* 4.50 - 10.70 10*3/mm3 Final   • RBC 01/08/2017 3.98  3.90 - 5.20 10*6/mm3 Final   • Hemoglobin 01/08/2017 13.1  11.9 - 15.5 g/dL Final   • Hematocrit 01/08/2017 40.0  35.6 - 45.5 % Final   • MCV 01/08/2017 100.5* 80.5 - 98.2 fL Final   • MCH 01/08/2017 32.9* 26.9 - 32.0 pg Final   • MCHC 01/08/2017 32.8  32.4 - 36.3 g/dL Final   • RDW 01/08/2017 13.0  11.7 - 13.0 % Final   • RDW-SD 01/08/2017 47.9  37.0 - 54.0 fl Final   • MPV 01/08/2017 9.9  6.0 - 12.0 fL Final   • Platelets 01/08/2017 210  140 - 500 10*3/mm3 Final   • Glucose 01/08/2017 113* 65 - 99 mg/dL Final   • BUN 01/08/2017 9  6 - 20 mg/dL Final   • Creatinine 01/08/2017 0.53* 0.57 - 1.00 mg/dL Final   • Sodium 01/08/2017 135* 136 - 145 mmol/L Final   • Potassium 01/08/2017 3.7  3.5 - 5.2 mmol/L Final   • Chloride 01/08/2017 98  98 - 107 mmol/L Final   • CO2 01/08/2017 22.7  22.0 - 29.0 mmol/L Final   • Calcium 01/08/2017 9.3  8.6 - 10.5 mg/dL Final   • eGFR Non African Amer  01/08/2017 118  >60 mL/min/1.73 Final   • BUN/Creatinine Ratio 01/08/2017 17.0  7.0 - 25.0 Final   • Anion Gap 01/08/2017 14.3  mmol/L Final     Lab Results (last 24 hours)     Procedure Component Value Units Date/Time    CBC & Differential [[id number]] Collected:  01/07/17 1313    Specimen:  Blood Updated:  01/07/17 1331    Narrative:       The following orders were created for panel order CBC & Differential.  Procedure                               Abnormality         Status                     ---------                               -----------         ------                     CBC Auto Differential[[id number]]         Abnormal            Final result                 Please view results for these tests on the individual orders.    CBC Auto Differential [[id number]]  (Abnormal) Collected:  01/07/17 1313    Specimen:  Blood Updated:  01/07/17 1331     WBC 12.84 (H) 10*3/mm3      RBC 4.09 10*6/mm3      Hemoglobin 13.5 g/dL      Hematocrit 40.4 %      MCV 98.8 (H) fL      MCH 33.0 (H) pg      MCHC 33.4 g/dL      RDW 12.8 %      RDW-SD 46.3 fl      MPV 10.2 fL      Platelets 265 10*3/mm3      Neutrophil % 81.4 (H) %      Lymphocyte % 10.4 (L) %      Monocyte % 6.7 %      Eosinophil % 1.0 %      Basophil % 0.3 %      Immature Grans % 0.2 %      Neutrophils, Absolute 10.46 (H) 10*3/mm3      Lymphocytes, Absolute 1.33 10*3/mm3      Monocytes, Absolute 0.86 10*3/mm3      Eosinophils, Absolute 0.13 10*3/mm3      Basophils, Absolute 0.04 10*3/mm3      Immature Grans, Absolute 0.02 10*3/mm3     Protime-INR [[id number]]  (Normal) Collected:  01/07/17 1313    Specimen:  Blood Updated:  01/07/17 1339     Protime 12.7 Seconds      INR 0.99     Comprehensive Metabolic Panel [[id number]]  (Abnormal) Collected:  01/07/17 1313    Specimen:  Blood Updated:  01/07/17 1354     Glucose 111 (H) mg/dL      BUN 14 mg/dL      Creatinine 0.55 (L) mg/dL      Sodium 140 mmol/L      Potassium 4.0 mmol/L      Chloride 101 mmol/L      CO2 23.0 mmol/L       Calcium 9.3 mg/dL      Total Protein 7.1 g/dL      Albumin 4.40 g/dL      ALT (SGPT) 23 U/L      AST (SGOT) 23 U/L      Alkaline Phosphatase 118 (H) U/L      Total Bilirubin 0.5 mg/dL      eGFR Non African Amer 113 mL/min/1.73      Globulin 2.7 gm/dL      A/G Ratio 1.6 g/dL      BUN/Creatinine Ratio 25.5 (H)      Anion Gap 16.0 mmol/L     TSH [[id number]]  (Normal) Collected:  01/07/17 1313    Specimen:  Blood Updated:  01/07/17 1513     TSH 3.690 mIU/mL     CBC (No Diff) [[id number]]  (Abnormal) Collected:  01/08/17 0423    Specimen:  Blood Updated:  01/08/17 0447     WBC 13.59 (H) 10*3/mm3      RBC 3.98 10*6/mm3      Hemoglobin 13.1 g/dL      Hematocrit 40.0 %      .5 (H) fL      MCH 32.9 (H) pg      MCHC 32.8 g/dL      RDW 13.0 %      RDW-SD 47.9 fl      MPV 9.9 fL      Platelets 210 10*3/mm3     Basic Metabolic Panel [[id number]]  (Abnormal) Collected:  01/08/17 0423    Specimen:  Blood Updated:  01/08/17 0505     Glucose 113 (H) mg/dL      BUN 9 mg/dL      Creatinine 0.53 (L) mg/dL      Sodium 135 (L) mmol/L      Potassium 3.7 mmol/L      Chloride 98 mmol/L      CO2 22.7 mmol/L      Calcium 9.3 mg/dL      eGFR Non African Amer 118 mL/min/1.73      BUN/Creatinine Ratio 17.0      Anion Gap 14.3 mmol/L     Narrative:       GFR Normal >60  Chronic Kidney Disease <60  Kidney Failure <15          Imaging:  AP pelvis and lateral views of the left hip are available for review on the [facility] system along with the associated report. The films show a displaced subcapital femoral neck fracture.      Assessment:  Left femoral neck fracture    Plan:  We had a thorough discussion regarding the risks, benefits and alternatives to an arthroplasty and non-surgical management versus surgery.  I explained the pros and cons of a hemiarthroplasty versus a JAVON.  I had a lengthy conversation with the patient and her son.  She would prefer a JAVON rather than a alejandro.  I will transfer her care to my partner, Dr. Marcos, for surgical  management.  Thanks for the consultation.  Please call with any questions or concerns.    Date: 1/8/2017    Fausto Horvath MD    CC: Latasha Faulkner MD    Agree with above  No h/o preceeding pain prior to fall on ice with direct blow to hip on concrete.  Given young age will plan on JAVON.  I discussed the R/B/A of JAVON with pt and son:  Continuation of conservative management vs. JAVON discussed.  The patient wishes to proceed with total hip replacement.     Risk and benefits of surgery were reviewed.  Including, but not limited to, blood clots, anesthesia risk, infection, leg length discrepancy, fracture, skin/leg numbness, failure of the implant, need for future surgeries, continued pain, hematoma, need for transfusion, and death, among others.  The patient understands and wishes to proceed.     The spectrum of treatment options were discussed with the patient in detail including both the nonoperative and operative treatment modalities and their respective risks and benefits.  After thorough discussion, the patient has elected to undergo surgical treatment.  The details of the surgical procedure were explained including the location of probable incisions and a description of the likely implants to be used.  Models and diagrams were used as educational resources. The patient understands the likely convalescence after surgery, as well as the rehabilitation required.  We thoroughly discussed the risks, benefits, and alternatives to surgery.  The risks include but are not limited to the risk of infection, joint stiffness, blood clots (including DVT and/or pulmonary embolus along with the risk of death), neurologic and/or vascular injury, fracture, dislocation, nonunion, malunion, need for further surgery including hardware failure requiring revision, and continued pain. Following the completion of the discussion, the patient expressed understanding of this planned course of care, all their questions were answered and  consent will be obtained preoperatively.    Operative Plan: Posterior approach Total Hip Replacement a 1-2 day stay postoperatively with home health rehab

## 2017-01-08 NOTE — PLAN OF CARE
Problem: Patient Care Overview (Adult)  Goal: Plan of Care Review  Outcome: Ongoing (interventions implemented as appropriate)    01/08/17 0400   Coping/Psychosocial Response Interventions   Plan Of Care Reviewed With patient   Patient Care Overview   Progress no change   Outcome Evaluation   Outcome Summary/Follow up Plan vss, n/v status wnl, pain control issues, allowed medina insertion, npo-Dr Horvath to see this am        Goal: Adult Individualization and Mutuality  Outcome: Ongoing (interventions implemented as appropriate)  Goal: Discharge Needs Assessment  Outcome: Ongoing (interventions implemented as appropriate)    Problem: Fractured Hip (Adult)  Goal: Signs and Symptoms of Listed Potential Problems Will be Absent or Manageable (Fractured Hip)  Outcome: Ongoing (interventions implemented as appropriate)    Problem: Fall Risk (Adult)  Goal: Absence of Falls  Outcome: Ongoing (interventions implemented as appropriate)    Problem: Pressure Ulcer Risk (Wayne Scale) (Adult,Obstetrics,Pediatric)  Goal: Skin Integrity  Outcome: Ongoing (interventions implemented as appropriate)

## 2017-01-09 ENCOUNTER — ANESTHESIA EVENT (OUTPATIENT)
Dept: PERIOP | Facility: HOSPITAL | Age: 62
End: 2017-01-09

## 2017-01-09 ENCOUNTER — ANESTHESIA (OUTPATIENT)
Dept: PERIOP | Facility: HOSPITAL | Age: 62
End: 2017-01-09

## 2017-01-09 ENCOUNTER — APPOINTMENT (OUTPATIENT)
Dept: GENERAL RADIOLOGY | Facility: HOSPITAL | Age: 62
End: 2017-01-09

## 2017-01-09 LAB
BH CV ECHO MEAS - ACS: 1.9 CM
BH CV ECHO MEAS - AO MAX PG (FULL): 10 MMHG
BH CV ECHO MEAS - AO MEAN PG (FULL): 2 MMHG
BH CV ECHO MEAS - AO MEAN PG: 6 MMHG
BH CV ECHO MEAS - AO ROOT AREA (BSA CORRECTED): 2
BH CV ECHO MEAS - AO ROOT AREA: 9.1 CM^2
BH CV ECHO MEAS - AO ROOT DIAM: 3.4 CM
BH CV ECHO MEAS - AO V2 MAX: 159 CM/SEC
BH CV ECHO MEAS - AO V2 MEAN: 117 CM/SEC
BH CV ECHO MEAS - AO V2 VTI: 29 CM
BH CV ECHO MEAS - AVA(I,A): 2.6 CM^2
BH CV ECHO MEAS - AVA(I,D): 2.6 CM^2
BH CV ECHO MEAS - BSA(HAYCOCK): 1.7 M^2
BH CV ECHO MEAS - BSA: 1.7 M^2
BH CV ECHO MEAS - BZI_BMI: 21 KILOGRAMS/M^2
BH CV ECHO MEAS - BZI_METRIC_HEIGHT: 167.6 CM
BH CV ECHO MEAS - BZI_METRIC_WEIGHT: 59 KG
BH CV ECHO MEAS - CONTRAST EF (2CH): 61.7 ML/M^2
BH CV ECHO MEAS - CONTRAST EF 4CH: 66.3 ML/M^2
BH CV ECHO MEAS - EDV(CUBED): 91.1 ML
BH CV ECHO MEAS - EDV(MOD-SP2): 60 ML
BH CV ECHO MEAS - EDV(MOD-SP4): 89 ML
BH CV ECHO MEAS - EDV(TEICH): 92.4 ML
BH CV ECHO MEAS - EF(CUBED): 75.9 %
BH CV ECHO MEAS - EF(MOD-SP2): 61.7 %
BH CV ECHO MEAS - EF(MOD-SP4): 66.3 %
BH CV ECHO MEAS - EF(TEICH): 68 %
BH CV ECHO MEAS - ESV(CUBED): 22 ML
BH CV ECHO MEAS - ESV(MOD-SP2): 23 ML
BH CV ECHO MEAS - ESV(MOD-SP4): 30 ML
BH CV ECHO MEAS - ESV(TEICH): 29.6 ML
BH CV ECHO MEAS - FS: 37.8 %
BH CV ECHO MEAS - IVS/LVPW: 1.3
BH CV ECHO MEAS - IVSD: 0.8 CM
BH CV ECHO MEAS - LAT PEAK E' VEL: 11 CM/SEC
BH CV ECHO MEAS - LV DIASTOLIC VOL/BSA (35-75): 53.4 ML/M^2
BH CV ECHO MEAS - LV MASS(C)D: 95.7 GRAMS
BH CV ECHO MEAS - LV MASS(C)DI: 57.4 GRAMS/M^2
BH CV ECHO MEAS - LV MEAN PG: 4 MMHG
BH CV ECHO MEAS - LV SYSTOLIC VOL/BSA (12-30): 18 ML/M^2
BH CV ECHO MEAS - LV V1 MAX: 144 CM/SEC
BH CV ECHO MEAS - LV V1 MEAN: 96.1 CM/SEC
BH CV ECHO MEAS - LV V1 VTI: 24.4 CM
BH CV ECHO MEAS - LVIDD: 4.5 CM
BH CV ECHO MEAS - LVIDS: 2.8 CM
BH CV ECHO MEAS - LVLD AP2: 7.8 CM
BH CV ECHO MEAS - LVLD AP4: 7.4 CM
BH CV ECHO MEAS - LVLS AP2: 6.7 CM
BH CV ECHO MEAS - LVLS AP4: 6.3 CM
BH CV ECHO MEAS - LVOT AREA (M): 3.1 CM^2
BH CV ECHO MEAS - LVOT AREA: 3.1 CM^2
BH CV ECHO MEAS - LVOT DIAM: 2 CM
BH CV ECHO MEAS - LVPWD: 0.6 CM
BH CV ECHO MEAS - MED PEAK E' VEL: 9 CM/SEC
BH CV ECHO MEAS - MV A DUR: 0.09 SEC
BH CV ECHO MEAS - MV A MAX VEL: 92.3 CM/SEC
BH CV ECHO MEAS - MV DEC SLOPE: 503 CM/SEC^2
BH CV ECHO MEAS - MV DEC TIME: 0.17 SEC
BH CV ECHO MEAS - MV E MAX VEL: 81.4 CM/SEC
BH CV ECHO MEAS - MV E/A: 0.88
BH CV ECHO MEAS - MV MEAN PG: 3 MMHG
BH CV ECHO MEAS - MV P1/2T MAX VEL: 91.8 CM/SEC
BH CV ECHO MEAS - MV P1/2T: 53.5 MSEC
BH CV ECHO MEAS - MV V2 MEAN: 78 CM/SEC
BH CV ECHO MEAS - MV V2 VTI: 22.4 CM
BH CV ECHO MEAS - MVA P1/2T LCG: 2.4 CM^2
BH CV ECHO MEAS - MVA(P1/2T): 4.1 CM^2
BH CV ECHO MEAS - MVA(VTI): 3.4 CM^2
BH CV ECHO MEAS - PA ACC SLOPE: 27.4 CM/SEC^2
BH CV ECHO MEAS - PA ACC TIME: 0.1 SEC
BH CV ECHO MEAS - PA MAX PG: 5.7 MMHG
BH CV ECHO MEAS - PA PR(ACCEL): 36.3 MMHG
BH CV ECHO MEAS - PA V2 MAX: 119 CM/SEC
BH CV ECHO MEAS - PULM A REVS DUR: 0.07 SEC
BH CV ECHO MEAS - PULM A REVS VEL: 41 CM/SEC
BH CV ECHO MEAS - PULM DIAS VEL: 66.6 CM/SEC
BH CV ECHO MEAS - PULM S/D: 1.1
BH CV ECHO MEAS - PULM SYS VEL: 70.1 CM/SEC
BH CV ECHO MEAS - QP/QS: 3.3
BH CV ECHO MEAS - RAP SYSTOLE: 3 MMHG
BH CV ECHO MEAS - RV MEAN PG: 2 MMHG
BH CV ECHO MEAS - RV V1 MEAN: 62.5 CM/SEC
BH CV ECHO MEAS - RV V1 VTI: 20.2 CM
BH CV ECHO MEAS - RVOT AREA: 12.6 CM^2
BH CV ECHO MEAS - RVOT DIAM: 4 CM
BH CV ECHO MEAS - RVSP: 37 MMHG
BH CV ECHO MEAS - SI(AO): 158.1 ML/M^2
BH CV ECHO MEAS - SI(CUBED): 41.5 ML/M^2
BH CV ECHO MEAS - SI(LVOT): 46 ML/M^2
BH CV ECHO MEAS - SI(MOD-SP2): 22.2 ML/M^2
BH CV ECHO MEAS - SI(MOD-SP4): 35.4 ML/M^2
BH CV ECHO MEAS - SI(TEICH): 37.8 ML/M^2
BH CV ECHO MEAS - SV(AO): 263.3 ML
BH CV ECHO MEAS - SV(CUBED): 69.2 ML
BH CV ECHO MEAS - SV(LVOT): 76.7 ML
BH CV ECHO MEAS - SV(MOD-SP2): 37 ML
BH CV ECHO MEAS - SV(MOD-SP4): 59 ML
BH CV ECHO MEAS - SV(RVOT): 253.8 ML
BH CV ECHO MEAS - SV(TEICH): 62.9 ML
BH CV ECHO MEAS - TAPSE (>1.6): 2.9 CM2
BH CV ECHO MEAS - TR MAX VEL: 290 CM/SEC
BH CV XLRA - RV BASE: 4 CM
BH CV XLRA - TDI S': 17 CM/SEC
E/E' RATIO: 9
HBA1C MFR BLD: 4.97 % (ref 4.8–5.6)
LEFT ATRIUM VOLUME INDEX: 18 ML/M2

## 2017-01-09 PROCEDURE — 27130 TOTAL HIP ARTHROPLASTY: CPT | Performed by: ORTHOPAEDIC SURGERY

## 2017-01-09 PROCEDURE — 88311 DECALCIFY TISSUE: CPT | Performed by: ORTHOPAEDIC SURGERY

## 2017-01-09 PROCEDURE — 73501 X-RAY EXAM HIP UNI 1 VIEW: CPT

## 2017-01-09 PROCEDURE — 83036 HEMOGLOBIN GLYCOSYLATED A1C: CPT | Performed by: INTERNAL MEDICINE

## 2017-01-09 PROCEDURE — 25010000002 VANCOMYCIN PER 500 MG: Performed by: ORTHOPAEDIC SURGERY

## 2017-01-09 PROCEDURE — C1776 JOINT DEVICE (IMPLANTABLE): HCPCS | Performed by: ORTHOPAEDIC SURGERY

## 2017-01-09 PROCEDURE — 25010000002 PHENYLEPHRINE PER 1 ML: Performed by: ANESTHESIOLOGY

## 2017-01-09 PROCEDURE — 25010000002 KETOROLAC TROMETHAMINE PER 15 MG: Performed by: NURSE PRACTITIONER

## 2017-01-09 PROCEDURE — 25010000002 ONDANSETRON PER 1 MG: Performed by: INTERNAL MEDICINE

## 2017-01-09 PROCEDURE — 88305 TISSUE EXAM BY PATHOLOGIST: CPT | Performed by: ORTHOPAEDIC SURGERY

## 2017-01-09 PROCEDURE — 25010000002 MIDAZOLAM PER 1 MG: Performed by: ANESTHESIOLOGY

## 2017-01-09 PROCEDURE — 25010000002 PROPOFOL 10 MG/ML EMULSION: Performed by: ANESTHESIOLOGY

## 2017-01-09 PROCEDURE — 25010000002 DEXAMETHASONE PER 1 MG: Performed by: ANESTHESIOLOGY

## 2017-01-09 PROCEDURE — 25010000002 HYDROMORPHONE PER 4 MG: Performed by: ANESTHESIOLOGY

## 2017-01-09 PROCEDURE — 25010000002 HYDROMORPHONE PER 4 MG: Performed by: INTERNAL MEDICINE

## 2017-01-09 PROCEDURE — 27130 TOTAL HIP ARTHROPLASTY: CPT | Performed by: NURSE PRACTITIONER

## 2017-01-09 PROCEDURE — 25010000002 FENTANYL CITRATE (PF) 100 MCG/2ML SOLUTION: Performed by: ANESTHESIOLOGY

## 2017-01-09 PROCEDURE — 0SRB01A REPLACEMENT OF LEFT HIP JOINT WITH METAL SYNTHETIC SUBSTITUTE, UNCEMENTED, OPEN APPROACH: ICD-10-PCS | Performed by: ORTHOPAEDIC SURGERY

## 2017-01-09 PROCEDURE — 25010000002 NEOSTIGMINE 10 MG/10ML SOLUTION: Performed by: ANESTHESIOLOGY

## 2017-01-09 DEVICE — POLARSTEM STANDARD NON-CEMENTED                                    WITH TI/HA 2
Type: IMPLANTABLE DEVICE | Site: HIP | Status: FUNCTIONAL
Brand: POLARSTEM

## 2017-01-09 DEVICE — REFLECTION SPHERICAL HEAD SCREW 30MM
Type: IMPLANTABLE DEVICE | Site: HIP | Status: FUNCTIONAL
Brand: REFLECTION

## 2017-01-09 DEVICE — R3 20 DEGREE XLPE ACETABULAR LINER                                    32MM INNER DIAMETER X OUTER DIAMETER 50MM
Type: IMPLANTABLE DEVICE | Site: HIP | Status: FUNCTIONAL
Brand: R3

## 2017-01-09 DEVICE — OXINIUM FEMORAL HEAD 12/14 TAPER                                    32MM +8
Type: IMPLANTABLE DEVICE | Site: HIP | Status: FUNCTIONAL
Brand: OXINIUM

## 2017-01-09 DEVICE — R3 3 HOLE ACETABULAR SHELL 50MM
Type: IMPLANTABLE DEVICE | Site: HIP | Status: FUNCTIONAL
Brand: R3 ACETABULAR

## 2017-01-09 DEVICE — REFLECTION SPHERICAL HEAD SCREW 25MM
Type: IMPLANTABLE DEVICE | Site: HIP | Status: FUNCTIONAL
Brand: REFLECTION

## 2017-01-09 DEVICE — IMPLANTABLE DEVICE: Type: IMPLANTABLE DEVICE | Site: HIP | Status: FUNCTIONAL

## 2017-01-09 RX ORDER — DIPHENHYDRAMINE HYDROCHLORIDE 50 MG/ML
12.5 INJECTION INTRAMUSCULAR; INTRAVENOUS
Status: DISCONTINUED | OUTPATIENT
Start: 2017-01-09 | End: 2017-01-09 | Stop reason: HOSPADM

## 2017-01-09 RX ORDER — TRANEXAMIC ACID 100 MG/ML
INJECTION, SOLUTION INTRAVENOUS AS NEEDED
Status: DISCONTINUED | OUTPATIENT
Start: 2017-01-09 | End: 2017-01-09 | Stop reason: SURG

## 2017-01-09 RX ORDER — MIDAZOLAM HYDROCHLORIDE 1 MG/ML
1 INJECTION INTRAMUSCULAR; INTRAVENOUS
Status: DISCONTINUED | OUTPATIENT
Start: 2017-01-09 | End: 2017-01-09 | Stop reason: HOSPADM

## 2017-01-09 RX ORDER — PROMETHAZINE HYDROCHLORIDE 25 MG/1
25 SUPPOSITORY RECTAL ONCE AS NEEDED
Status: DISCONTINUED | OUTPATIENT
Start: 2017-01-09 | End: 2017-01-09 | Stop reason: HOSPADM

## 2017-01-09 RX ORDER — PANTOPRAZOLE SODIUM 40 MG/1
40 TABLET, DELAYED RELEASE ORAL
Status: DISCONTINUED | OUTPATIENT
Start: 2017-01-10 | End: 2017-01-11 | Stop reason: HOSPADM

## 2017-01-09 RX ORDER — FENTANYL CITRATE 50 UG/ML
50 INJECTION, SOLUTION INTRAMUSCULAR; INTRAVENOUS
Status: COMPLETED | OUTPATIENT
Start: 2017-01-09 | End: 2017-01-09

## 2017-01-09 RX ORDER — PROMETHAZINE HYDROCHLORIDE 25 MG/1
12.5 TABLET ORAL ONCE AS NEEDED
Status: DISCONTINUED | OUTPATIENT
Start: 2017-01-09 | End: 2017-01-09 | Stop reason: HOSPADM

## 2017-01-09 RX ORDER — PROMETHAZINE HYDROCHLORIDE 25 MG/ML
12.5 INJECTION, SOLUTION INTRAMUSCULAR; INTRAVENOUS ONCE AS NEEDED
Status: DISCONTINUED | OUTPATIENT
Start: 2017-01-09 | End: 2017-01-09 | Stop reason: HOSPADM

## 2017-01-09 RX ORDER — HYDROMORPHONE HCL 110MG/55ML
PATIENT CONTROLLED ANALGESIA SYRINGE INTRAVENOUS AS NEEDED
Status: DISCONTINUED | OUTPATIENT
Start: 2017-01-09 | End: 2017-01-09 | Stop reason: SURG

## 2017-01-09 RX ORDER — ROCURONIUM BROMIDE 10 MG/ML
INJECTION, SOLUTION INTRAVENOUS AS NEEDED
Status: DISCONTINUED | OUTPATIENT
Start: 2017-01-09 | End: 2017-01-09 | Stop reason: SURG

## 2017-01-09 RX ORDER — HYDROCODONE BITARTRATE AND ACETAMINOPHEN 7.5; 325 MG/1; MG/1
1 TABLET ORAL EVERY 4 HOURS PRN
Status: DISCONTINUED | OUTPATIENT
Start: 2017-01-09 | End: 2017-01-11 | Stop reason: HOSPADM

## 2017-01-09 RX ORDER — SODIUM CHLORIDE, SODIUM LACTATE, POTASSIUM CHLORIDE, CALCIUM CHLORIDE 600; 310; 30; 20 MG/100ML; MG/100ML; MG/100ML; MG/100ML
9 INJECTION, SOLUTION INTRAVENOUS CONTINUOUS
Status: DISCONTINUED | OUTPATIENT
Start: 2017-01-09 | End: 2017-01-09 | Stop reason: HOSPADM

## 2017-01-09 RX ORDER — VANCOMYCIN HYDROCHLORIDE 1 G/200ML
15 INJECTION, SOLUTION INTRAVENOUS EVERY 12 HOURS
Status: COMPLETED | OUTPATIENT
Start: 2017-01-10 | End: 2017-01-11

## 2017-01-09 RX ORDER — HYDRALAZINE HYDROCHLORIDE 20 MG/ML
5 INJECTION INTRAMUSCULAR; INTRAVENOUS
Status: DISCONTINUED | OUTPATIENT
Start: 2017-01-09 | End: 2017-01-09 | Stop reason: HOSPADM

## 2017-01-09 RX ORDER — MELOXICAM 15 MG/1
15 TABLET ORAL DAILY
Status: DISCONTINUED | OUTPATIENT
Start: 2017-01-10 | End: 2017-01-11 | Stop reason: HOSPADM

## 2017-01-09 RX ORDER — KETOROLAC TROMETHAMINE 30 MG/ML
30 INJECTION, SOLUTION INTRAMUSCULAR; INTRAVENOUS EVERY 8 HOURS
Status: COMPLETED | OUTPATIENT
Start: 2017-01-09 | End: 2017-01-10

## 2017-01-09 RX ORDER — OXYCODONE AND ACETAMINOPHEN 7.5; 325 MG/1; MG/1
1 TABLET ORAL ONCE AS NEEDED
Status: DISCONTINUED | OUTPATIENT
Start: 2017-01-09 | End: 2017-01-09 | Stop reason: HOSPADM

## 2017-01-09 RX ORDER — HYDROMORPHONE HYDROCHLORIDE 1 MG/ML
0.25 INJECTION, SOLUTION INTRAMUSCULAR; INTRAVENOUS; SUBCUTANEOUS
Status: DISCONTINUED | OUTPATIENT
Start: 2017-01-09 | End: 2017-01-09 | Stop reason: HOSPADM

## 2017-01-09 RX ORDER — HYDROCODONE BITARTRATE AND ACETAMINOPHEN 7.5; 325 MG/1; MG/1
2 TABLET ORAL EVERY 4 HOURS PRN
Status: DISCONTINUED | OUTPATIENT
Start: 2017-01-09 | End: 2017-01-11 | Stop reason: HOSPADM

## 2017-01-09 RX ORDER — PROPOFOL 10 MG/ML
VIAL (ML) INTRAVENOUS AS NEEDED
Status: DISCONTINUED | OUTPATIENT
Start: 2017-01-09 | End: 2017-01-09 | Stop reason: SURG

## 2017-01-09 RX ORDER — PROMETHAZINE HYDROCHLORIDE 25 MG/ML
12.5 INJECTION, SOLUTION INTRAMUSCULAR; INTRAVENOUS EVERY 6 HOURS PRN
Status: DISCONTINUED | OUTPATIENT
Start: 2017-01-09 | End: 2017-01-11 | Stop reason: HOSPADM

## 2017-01-09 RX ORDER — UREA 10 %
1 LOTION (ML) TOPICAL NIGHTLY PRN
Status: DISCONTINUED | OUTPATIENT
Start: 2017-01-09 | End: 2017-01-11 | Stop reason: HOSPADM

## 2017-01-09 RX ORDER — ONDANSETRON 4 MG/1
4 TABLET, ORALLY DISINTEGRATING ORAL EVERY 6 HOURS PRN
Status: DISCONTINUED | OUTPATIENT
Start: 2017-01-09 | End: 2017-01-11 | Stop reason: HOSPADM

## 2017-01-09 RX ORDER — NEOSTIGMINE METHYLSULFATE 1 MG/ML
INJECTION, SOLUTION INTRAVENOUS AS NEEDED
Status: DISCONTINUED | OUTPATIENT
Start: 2017-01-09 | End: 2017-01-09 | Stop reason: SURG

## 2017-01-09 RX ORDER — MIDAZOLAM HYDROCHLORIDE 1 MG/ML
2 INJECTION INTRAMUSCULAR; INTRAVENOUS
Status: DISCONTINUED | OUTPATIENT
Start: 2017-01-09 | End: 2017-01-09 | Stop reason: HOSPADM

## 2017-01-09 RX ORDER — HYDROMORPHONE HYDROCHLORIDE 1 MG/ML
0.5 INJECTION, SOLUTION INTRAMUSCULAR; INTRAVENOUS; SUBCUTANEOUS
Status: DISCONTINUED | OUTPATIENT
Start: 2017-01-09 | End: 2017-01-09 | Stop reason: HOSPADM

## 2017-01-09 RX ORDER — POLYETHYLENE GLYCOL 3350 17 G/17G
17 POWDER, FOR SOLUTION ORAL 2 TIMES DAILY
Status: DISCONTINUED | OUTPATIENT
Start: 2017-01-09 | End: 2017-01-11 | Stop reason: HOSPADM

## 2017-01-09 RX ORDER — PROMETHAZINE HYDROCHLORIDE 25 MG/1
25 TABLET ORAL ONCE AS NEEDED
Status: DISCONTINUED | OUTPATIENT
Start: 2017-01-09 | End: 2017-01-09 | Stop reason: HOSPADM

## 2017-01-09 RX ORDER — ONDANSETRON 2 MG/ML
4 INJECTION INTRAMUSCULAR; INTRAVENOUS ONCE AS NEEDED
Status: DISCONTINUED | OUTPATIENT
Start: 2017-01-09 | End: 2017-01-09 | Stop reason: HOSPADM

## 2017-01-09 RX ORDER — ACETAMINOPHEN 325 MG/1
650 TABLET ORAL EVERY 4 HOURS PRN
Status: DISCONTINUED | OUTPATIENT
Start: 2017-01-09 | End: 2017-01-11 | Stop reason: HOSPADM

## 2017-01-09 RX ORDER — HYDROCODONE BITARTRATE AND ACETAMINOPHEN 7.5; 325 MG/1; MG/1
1 TABLET ORAL ONCE AS NEEDED
Status: DISCONTINUED | OUTPATIENT
Start: 2017-01-09 | End: 2017-01-09 | Stop reason: HOSPADM

## 2017-01-09 RX ORDER — GLYCOPYRROLATE 0.2 MG/ML
INJECTION INTRAMUSCULAR; INTRAVENOUS AS NEEDED
Status: DISCONTINUED | OUTPATIENT
Start: 2017-01-09 | End: 2017-01-09 | Stop reason: SURG

## 2017-01-09 RX ORDER — ONDANSETRON 4 MG/1
4 TABLET, FILM COATED ORAL EVERY 6 HOURS PRN
Status: DISCONTINUED | OUTPATIENT
Start: 2017-01-09 | End: 2017-01-11 | Stop reason: HOSPADM

## 2017-01-09 RX ORDER — ASPIRIN 325 MG
325 TABLET, DELAYED RELEASE (ENTERIC COATED) ORAL 2 TIMES DAILY
Status: DISCONTINUED | OUTPATIENT
Start: 2017-01-09 | End: 2017-01-11 | Stop reason: HOSPADM

## 2017-01-09 RX ORDER — DIPHENHYDRAMINE HCL 25 MG
25 CAPSULE ORAL EVERY 6 HOURS PRN
Status: DISCONTINUED | OUTPATIENT
Start: 2017-01-09 | End: 2017-01-11 | Stop reason: HOSPADM

## 2017-01-09 RX ORDER — ACETAMINOPHEN 10 MG/ML
INJECTION, SOLUTION INTRAVENOUS AS NEEDED
Status: DISCONTINUED | OUTPATIENT
Start: 2017-01-09 | End: 2017-01-09 | Stop reason: SURG

## 2017-01-09 RX ORDER — ONDANSETRON 2 MG/ML
4 INJECTION INTRAMUSCULAR; INTRAVENOUS EVERY 6 HOURS PRN
Status: DISCONTINUED | OUTPATIENT
Start: 2017-01-09 | End: 2017-01-11 | Stop reason: HOSPADM

## 2017-01-09 RX ORDER — FLUMAZENIL 0.1 MG/ML
0.2 INJECTION INTRAVENOUS AS NEEDED
Status: DISCONTINUED | OUTPATIENT
Start: 2017-01-09 | End: 2017-01-09 | Stop reason: HOSPADM

## 2017-01-09 RX ORDER — FAMOTIDINE 10 MG/ML
20 INJECTION, SOLUTION INTRAVENOUS ONCE
Status: COMPLETED | OUTPATIENT
Start: 2017-01-09 | End: 2017-01-09

## 2017-01-09 RX ORDER — NALOXONE HCL 0.4 MG/ML
0.2 VIAL (ML) INJECTION AS NEEDED
Status: DISCONTINUED | OUTPATIENT
Start: 2017-01-09 | End: 2017-01-09 | Stop reason: HOSPADM

## 2017-01-09 RX ORDER — DOCUSATE SODIUM 100 MG/1
100 CAPSULE, LIQUID FILLED ORAL 2 TIMES DAILY
Status: DISCONTINUED | OUTPATIENT
Start: 2017-01-09 | End: 2017-01-11 | Stop reason: HOSPADM

## 2017-01-09 RX ORDER — SODIUM CHLORIDE 0.9 % (FLUSH) 0.9 %
1-10 SYRINGE (ML) INJECTION AS NEEDED
Status: DISCONTINUED | OUTPATIENT
Start: 2017-01-09 | End: 2017-01-09 | Stop reason: HOSPADM

## 2017-01-09 RX ORDER — LABETALOL HYDROCHLORIDE 5 MG/ML
5 INJECTION, SOLUTION INTRAVENOUS
Status: DISCONTINUED | OUTPATIENT
Start: 2017-01-09 | End: 2017-01-09 | Stop reason: HOSPADM

## 2017-01-09 RX ORDER — ECHINACEA PURPUREA EXTRACT 125 MG
2 TABLET ORAL AS NEEDED
Status: DISCONTINUED | OUTPATIENT
Start: 2017-01-09 | End: 2017-01-09 | Stop reason: HOSPADM

## 2017-01-09 RX ORDER — FENTANYL CITRATE 50 UG/ML
50 INJECTION, SOLUTION INTRAMUSCULAR; INTRAVENOUS
Status: DISCONTINUED | OUTPATIENT
Start: 2017-01-09 | End: 2017-01-09 | Stop reason: HOSPADM

## 2017-01-09 RX ORDER — DEXAMETHASONE SODIUM PHOSPHATE 10 MG/ML
INJECTION INTRAMUSCULAR; INTRAVENOUS AS NEEDED
Status: DISCONTINUED | OUTPATIENT
Start: 2017-01-09 | End: 2017-01-09 | Stop reason: SURG

## 2017-01-09 RX ORDER — VANCOMYCIN HYDROCHLORIDE 1 G/200ML
1000 INJECTION, SOLUTION INTRAVENOUS ONCE
Status: COMPLETED | OUTPATIENT
Start: 2017-01-09 | End: 2017-01-09

## 2017-01-09 RX ADMIN — HYDROMORPHONE HYDROCHLORIDE 0.5 MG: 2 INJECTION, SOLUTION INTRAMUSCULAR; INTRAVENOUS; SUBCUTANEOUS at 17:19

## 2017-01-09 RX ADMIN — SODIUM CHLORIDE, POTASSIUM CHLORIDE, SODIUM LACTATE AND CALCIUM CHLORIDE: 600; 310; 30; 20 INJECTION, SOLUTION INTRAVENOUS at 16:45

## 2017-01-09 RX ADMIN — DEXAMETHASONE SODIUM PHOSPHATE 6 MG: 10 INJECTION INTRAMUSCULAR; INTRAVENOUS at 16:59

## 2017-01-09 RX ADMIN — MIDAZOLAM 1 MG: 1 INJECTION INTRAMUSCULAR; INTRAVENOUS at 14:54

## 2017-01-09 RX ADMIN — PHENYLEPHRINE HYDROCHLORIDE 100 MCG: 10 INJECTION INTRAVENOUS at 17:30

## 2017-01-09 RX ADMIN — FAMOTIDINE 20 MG: 10 INJECTION, SOLUTION INTRAVENOUS at 14:54

## 2017-01-09 RX ADMIN — HYDROMORPHONE HYDROCHLORIDE 0.5 MG: 1 INJECTION, SOLUTION INTRAMUSCULAR; INTRAVENOUS; SUBCUTANEOUS at 11:07

## 2017-01-09 RX ADMIN — MUPIROCIN: 20 OINTMENT TOPICAL at 21:01

## 2017-01-09 RX ADMIN — ROCURONIUM BROMIDE 35 MG: 10 INJECTION INTRAVENOUS at 15:56

## 2017-01-09 RX ADMIN — NEOSTIGMINE METHYLSULFATE 3 MG: 1 INJECTION INTRAVENOUS at 17:15

## 2017-01-09 RX ADMIN — ASPIRIN 325 MG: 325 TABLET, DELAYED RELEASE ORAL at 20:15

## 2017-01-09 RX ADMIN — GLYCOPYRROLATE 0.6 MG: 0.2 INJECTION INTRAMUSCULAR; INTRAVENOUS at 17:15

## 2017-01-09 RX ADMIN — FENTANYL CITRATE 50 MCG: 50 INJECTION INTRAMUSCULAR; INTRAVENOUS at 16:38

## 2017-01-09 RX ADMIN — KETOROLAC TROMETHAMINE 30 MG: 30 INJECTION, SOLUTION INTRAMUSCULAR at 21:00

## 2017-01-09 RX ADMIN — FENTANYL CITRATE 25 MCG: 50 INJECTION INTRAMUSCULAR; INTRAVENOUS at 14:55

## 2017-01-09 RX ADMIN — HYDROMORPHONE HYDROCHLORIDE 0.5 MG: 1 INJECTION, SOLUTION INTRAMUSCULAR; INTRAVENOUS; SUBCUTANEOUS at 09:00

## 2017-01-09 RX ADMIN — HYDROMORPHONE HYDROCHLORIDE 0.5 MG: 1 INJECTION, SOLUTION INTRAMUSCULAR; INTRAVENOUS; SUBCUTANEOUS at 06:40

## 2017-01-09 RX ADMIN — TRANEXAMIC ACID 1000 MG: 100 INJECTION, SOLUTION INTRAVENOUS at 16:16

## 2017-01-09 RX ADMIN — DOCUSATE SODIUM 100 MG: 100 CAPSULE, LIQUID FILLED ORAL at 21:00

## 2017-01-09 RX ADMIN — SODIUM CHLORIDE, POTASSIUM CHLORIDE, SODIUM LACTATE AND CALCIUM CHLORIDE: 600; 310; 30; 20 INJECTION, SOLUTION INTRAVENOUS at 15:50

## 2017-01-09 RX ADMIN — POLYETHYLENE GLYCOL 3350 17 G: 17 POWDER, FOR SOLUTION ORAL at 21:00

## 2017-01-09 RX ADMIN — HYDROMORPHONE HYDROCHLORIDE 0.5 MG: 1 INJECTION, SOLUTION INTRAMUSCULAR; INTRAVENOUS; SUBCUTANEOUS at 02:22

## 2017-01-09 RX ADMIN — DIAZEPAM 5 MG: 5 TABLET ORAL at 10:43

## 2017-01-09 RX ADMIN — FENTANYL CITRATE 50 MCG: 50 INJECTION INTRAMUSCULAR; INTRAVENOUS at 16:40

## 2017-01-09 RX ADMIN — FENTANYL CITRATE 50 MCG: 50 INJECTION INTRAMUSCULAR; INTRAVENOUS at 16:49

## 2017-01-09 RX ADMIN — PHENYLEPHRINE HYDROCHLORIDE 100 MCG: 10 INJECTION INTRAVENOUS at 17:18

## 2017-01-09 RX ADMIN — PROPOFOL 150 MG: 10 INJECTION, EMULSION INTRAVENOUS at 15:56

## 2017-01-09 RX ADMIN — VANCOMYCIN HYDROCHLORIDE 1000 MG: 1 INJECTION, SOLUTION INTRAVENOUS at 14:44

## 2017-01-09 RX ADMIN — HYDROMORPHONE HYDROCHLORIDE 0.5 MG: 1 INJECTION, SOLUTION INTRAMUSCULAR; INTRAVENOUS; SUBCUTANEOUS at 12:59

## 2017-01-09 RX ADMIN — DIAZEPAM 5 MG: 5 TABLET ORAL at 04:37

## 2017-01-09 RX ADMIN — FENTANYL CITRATE 50 MCG: 50 INJECTION INTRAMUSCULAR; INTRAVENOUS at 15:52

## 2017-01-09 RX ADMIN — ONDANSETRON 4 MG: 2 INJECTION INTRAMUSCULAR; INTRAVENOUS at 16:59

## 2017-01-09 RX ADMIN — HYDROMORPHONE HYDROCHLORIDE 0.5 MG: 2 INJECTION, SOLUTION INTRAMUSCULAR; INTRAVENOUS; SUBCUTANEOUS at 17:17

## 2017-01-09 RX ADMIN — HYDROMORPHONE HYDROCHLORIDE 0.5 MG: 1 INJECTION, SOLUTION INTRAMUSCULAR; INTRAVENOUS; SUBCUTANEOUS at 04:37

## 2017-01-09 RX ADMIN — ROCURONIUM BROMIDE 10 MG: 10 INJECTION INTRAVENOUS at 16:38

## 2017-01-09 RX ADMIN — PHENYLEPHRINE HYDROCHLORIDE 100 MCG: 10 INJECTION INTRAVENOUS at 17:44

## 2017-01-09 RX ADMIN — FENTANYL CITRATE 50 MCG: 50 INJECTION INTRAMUSCULAR; INTRAVENOUS at 15:45

## 2017-01-09 RX ADMIN — ACETAMINOPHEN 1000 MG: 10 INJECTION, SOLUTION INTRAVENOUS at 16:07

## 2017-01-09 RX ADMIN — EPHEDRINE SULFATE 10 MG: 50 INJECTION INTRAMUSCULAR; INTRAVENOUS; SUBCUTANEOUS at 17:26

## 2017-01-09 RX ADMIN — HYDROCODONE BITARTRATE AND ACETAMINOPHEN 2 TABLET: 7.5; 325 TABLET ORAL at 20:15

## 2017-01-09 NOTE — PLAN OF CARE
Problem: Patient Care Overview (Adult)  Goal: Plan of Care Review  Outcome: Ongoing (interventions implemented as appropriate)    01/09/17 0356   Coping/Psychosocial Response Interventions   Plan Of Care Reviewed With patient   Patient Care Overview   Progress no change   Outcome Evaluation   Outcome Summary/Follow up Plan pt to have surgery soon         Problem: Fall Risk (Adult)  Goal: Absence of Falls  Outcome: Ongoing (interventions implemented as appropriate)    01/09/17 0356   Fall Risk (Adult)   Absence of Falls making progress toward outcome

## 2017-01-09 NOTE — ANESTHESIA PROCEDURE NOTES
Airway  Urgency: elective    Airway not difficult    General Information and Staff    Patient location during procedure: OR  Anesthesiologist: ZAHRAA GARCIA    Indications and Patient Condition  Indications for airway management: airway protection    Preoxygenated: yes  Mask difficulty assessment: 1 - vent by mask    Final Airway Details  Final airway type: endotracheal airway      Successful airway: ETT  Cuffed: yes   Successful intubation technique: direct laryngoscopy  Endotracheal tube insertion site: oral  Blade: Encinas  Blade size: #3  ETT size: 7.0 mm  Cormack-Lehane Classification: grade IIb - view of arytenoids or posterior of glottis only  Placement verified by: chest auscultation and capnometry   Measured from: lips  Number of attempts at approach: 1

## 2017-01-09 NOTE — PROGRESS NOTES
" LOS: 2 days   Primary Care Physician: RODOLFO Doyle MD     Subjective  Pain is still severe at times.  Pain medicines take the edge off.  Anxious to get the surgery done.    Vital Signs  Body mass index is 20.98 kg/(m^2).  Temp:  [99.1 °F (37.3 °C)-100.8 °F (38.2 °C)] 100.4 °F (38 °C)  Heart Rate:  [] 97  Resp:  [16-18] 18  BP: (100-134)/(66-85) 134/85      Objective:  General Appearance:  In no acute distress.    Vital signs: (most recent): Blood pressure 134/85, pulse 97, temperature 100.4 °F (38 °C), temperature source Oral, resp. rate 18, height 66\" (167.6 cm), weight 130 lb (59 kg), SpO2 94 %.    Lungs:  There are decreased breath sounds.  No wheezes, rales or rhonchi.    Heart: Normal rate.  Regular rhythm.  No murmur.   Abdomen: Abdomen is soft and non-distended.  There is no abdominal tenderness.   There is no splenomegaly. There is no hepatomegaly.   Extremities: There is no dependent edema.  (No tremor)  Neurological: Patient is alert.          Results Review:    I reviewed the patient's new clinical results.      Results from last 7 days  Lab Units 01/08/17  0423 01/07/17  1313   WBC 10*3/mm3 13.59* 12.84*   HEMOGLOBIN g/dL 13.1 13.5   PLATELETS 10*3/mm3 210 265       Results from last 7 days  Lab Units 01/08/17  0423 01/07/17  1313   SODIUM mmol/L 135* 140   POTASSIUM mmol/L 3.7 4.0   CHLORIDE mmol/L 98 101   TOTAL CO2 mmol/L 22.7 23.0   BUN mg/dL 9 14   CREATININE mg/dL 0.53* 0.55*   CALCIUM mg/dL 9.3 9.3   GLUCOSE mg/dL 113* 111*       Results from last 7 days  Lab Units 01/07/17  1313   INR  0.99     Hemoglobin A1C:No results found for: HGBA1C    Glucose Range:No results found for: POCGLU    Medication Review: Yes    Physical Therapy:    Assessment/Plan     Active Hospital Problems (** Indicates Principal Problem)    Diagnosis Date Noted   • **Closed fracture of neck of left femur [S72.002A] 01/07/2017   • EKG abnormality [R94.31] 01/08/2017   • Hyperglycemia [R73.9] 01/08/2017   • " Uncomplicated alcohol dependence [F10.20] 01/08/2017   • Tobacco abuse [Z72.0] 01/08/2017   • Leukocytosis [D72.829] 01/08/2017      Resolved Hospital Problems    Diagnosis Date Noted Date Resolved   No resolved problems to display.       Assessment & Plan   1.  Left subcapital femur fracture with dislocation.  Total hip replacement today.  2.  Abnormal EKG although echo normal.  No further workup needed.  No acute issues  3.  Low-grade fever last night, likely secondary to fracture plus minus atelectasis.  Start incentive spirometry.  Recheck labs in a.m.  Mild leukocytosis noted, reactive.      Latasha Faulkner MD  01/09/17  11:59 AM

## 2017-01-09 NOTE — ANESTHESIA POSTPROCEDURE EVALUATION
Patient: Chantell Knight    Procedure Summary     Date Anesthesia Start Anesthesia Stop Room / Location    01/09/17 [id number]  [facility] /  [facility]       Procedure Diagnosis Surgeon Provider    LEFT TOTAL HIP ARTHROPLASTY (POSTERIOR) (Left Hip) No diagnosis on file. MD Akila Rodriguez MD          Anesthesia Type: general  Last vitals  /67 (01/09/17 1830)    Temp 36.7 °C (98.1 °F) (01/09/17 1830)    Pulse 84 (01/09/17 1830)   Resp 16 (01/09/17 1830)    SpO2 97 % (01/09/17 1830)      Post Anesthesia Care and Evaluation    Patient location during evaluation: PACU  Patient participation: complete - patient participated  Level of consciousness: awake and alert  Pain management: adequate  Airway patency: patent  Anesthetic complications: No anesthetic complications  Cardiovascular status: acceptable  Respiratory status: acceptable  Hydration status: acceptable

## 2017-01-09 NOTE — PLAN OF CARE
Problem: Perioperative Period (Adult)  Goal: Signs and Symptoms of Listed Potential Problems Will be Absent or Manageable (Perioperative Period)  Outcome: Ongoing (interventions implemented as appropriate)    01/09/17 6904   Perioperative Period   Problems Assessed (Perioperative Period) pain;situational response   Problems Present (Perioperative Period) pain;situational response

## 2017-01-09 NOTE — OP NOTE
Name: Chantell Knight  YOB: 1955    DATE OF SURGERY: 1/9/2017    PREOPERATIVE DIAGNOSIS: Left hip femoral neck fracture    POSTOPERATIVE DIAGNOSIS: Left hip femoral neck fracture    PROCEDURE PERFORMED: Left  total hip replacement    SURGEON: Trever Marcos M.D.    ASSISTANT: LILLI RAUSCH    IMPLANTS:    Implant Name Type Inv. Item Serial No.  Lot No. LRB No. Used   LINER ACET R3 XLPE 20D [id number] - [id number] Implant LINER ACET R3 XLPE 20D [id number]  [employer] [id number] Left 1   SHLL ACET R3 3H STD 50MM - [id number] Implant SHLL ACET R3 3H STD 50MM  [employer] NEP[employer] [id number] Left 1   SCRW SPH HD REFLECTION 6.5X30MM - [id number] Implant SCRW SPH HD REFLECTION 6.5X30MM  [employer] NEP[employer] [id number] Left 1   SCRW SPH HD REFLECTION 6.5X25MM - [id number] Implant SCRW SPH HD REFLECTION 6.5X25MM  [employer] [id number] Left 1   stem standardwith TI/HA    [employer] NEPHEW [id number] Left 1   HD FEM OXINIUM TPR 12 14 32MM PLS8 - [id number] Implant HD FEM OXINIUM TPR 12 14 32MM PLS8   [employer] NEPHE[employer] [id number] Left 1       Estimated Blood Loss: 200cc  Specimens : none  Complications: none    DESCRIPTION OF PROCEDURE:    The patient was taken to the operating room and placed in the supine position. A sequential compression device was carefully placed on the non-operative leg. Preoperative antibiotics were administered. Surgical time out was performed. After adequate induction of anesthesia the patient was then transferred onto the  table and positioned appropriately in the lateral decubitus position. The hip was then prepped and draped in the usual sterile fashion.    A posterior lateral surgical incision was then made.  The gluteus vick fascia was then divided the gluteus vick muscle was then bluntly dissected.  A Charnley self-retaining retractor was then placed.  A posterior capsulotomy was then performed.  tHE INTRAARTICULAR HEMATOMA WAS EVACUATED. The superior limb was divided in line  with the piriformis tendon.  The hip was then dislocated.  The femoral neck osteotomy was performed according to the template.  The femoral head was removed.  The head and neck fragments were evaluated there is no evidence of obvious pathologic lesion.  The acetabulum was exposed with standard retractors.  The labrum and pulvinar were then excised.  The cup was then medialized with the starting acetabular reamer to the medial wall.  I then progressively reamed up to the appropriate size and 45° of abduction and 20° of anteversion. Line to line reaming was performed. At this point the bone was nicely prepared there was excellent bleeding bone. We then impacted the acetabular component in 45 of abduction and 20 of anteversion the cup was stable.  Per routine we augmented the fixation with 2 screws in the posterior and superior quadrant  The final liner was then placed and it locked in nicely.  At this point we injected the hip with anesthetic cocktail solution.  We then turned our attention to the femur.   Standard retractors were placed to expose the femur.  The box osteotome was used to create a starting point.  The canal finder was then used to sound the canal.  The lateralizing reamer was then used to lateralize into the greater trochanter.  We progressively reamed and broached until the broach was very solid to axial and rotational stresses.  At this point we placed the trial neck and head hip was very stable to flexion and internal rotation as well as extension and external rotation.  The leg lengths were measured to be equal.  We then removed the trial components,copiously irrigated the hip, and then impacted the final stem.  At this point we then trialed and chose the final head. The head was placed on a clean dry taper.  The leg lengths were again measured to be equal.  At this point the hip was copiously irrigated with pulse lavage and the capsule was then reapproximated with #1 PDS suture, and the remainder  of the hip was closed in multiple layers in standard fashion..  There was excellent hemostasis. We placed a one-eighth inch Hemovac drain.  Sterile dressing were applied. At the end of the case, the sponge and needle counts were reported as being correct. There were no known complications. The patient was then transported to the recovery room.      Trever Marcos M.D.  1/9/2017

## 2017-01-09 NOTE — ANESTHESIA PREPROCEDURE EVALUATION
Anesthesia Evaluation      Airway   Mallampati: II  TM distance: >3 FB  Neck ROM: full  no difficulty expected  Dental - normal exam     Pulmonary - normal exam   (+) hx of smoking,   Cardiovascular - normal exam    Neuro/Psych  (+) psychiatric history,    GI/Hepatic/Renal/Endo      Musculoskeletal     Abdominal    Substance History      OB/GYN          Other                             Anesthesia Plan    ASA 2     general     intravenous induction   Anesthetic plan and risks discussed with patient.

## 2017-01-10 LAB
ANION GAP SERPL CALCULATED.3IONS-SCNC: 13.5 MMOL/L
BUN BLD-MCNC: 6 MG/DL (ref 6–20)
BUN/CREAT SERPL: 13 (ref 7–25)
CALCIUM SPEC-SCNC: 9 MG/DL (ref 8.6–10.5)
CHLORIDE SERPL-SCNC: 98 MMOL/L (ref 98–107)
CO2 SERPL-SCNC: 26.5 MMOL/L (ref 22–29)
CREAT BLD-MCNC: 0.46 MG/DL (ref 0.57–1)
FOLATE SERPL-MCNC: 18.04 NG/ML (ref 4.78–24.2)
GFR SERPL CREATININE-BSD FRML MDRD: 139 ML/MIN/1.73
GLUCOSE BLD-MCNC: 226 MG/DL (ref 65–99)
HCT VFR BLD AUTO: 34.4 % (ref 35.6–45.5)
HGB BLD-MCNC: 11.2 G/DL (ref 11.9–15.5)
POTASSIUM BLD-SCNC: 3.8 MMOL/L (ref 3.5–5.2)
SODIUM BLD-SCNC: 138 MMOL/L (ref 136–145)
VIT B12 BLD-MCNC: >2000 PG/ML (ref 211–946)

## 2017-01-10 PROCEDURE — 85018 HEMOGLOBIN: CPT | Performed by: NURSE PRACTITIONER

## 2017-01-10 PROCEDURE — 25010000002 VANCOMYCIN PER 500 MG: Performed by: NURSE PRACTITIONER

## 2017-01-10 PROCEDURE — 82607 VITAMIN B-12: CPT | Performed by: INTERNAL MEDICINE

## 2017-01-10 PROCEDURE — 25010000002 KETOROLAC TROMETHAMINE PER 15 MG: Performed by: NURSE PRACTITIONER

## 2017-01-10 PROCEDURE — 97110 THERAPEUTIC EXERCISES: CPT

## 2017-01-10 PROCEDURE — 85014 HEMATOCRIT: CPT | Performed by: NURSE PRACTITIONER

## 2017-01-10 PROCEDURE — 82746 ASSAY OF FOLIC ACID SERUM: CPT | Performed by: INTERNAL MEDICINE

## 2017-01-10 PROCEDURE — 97161 PT EVAL LOW COMPLEX 20 MIN: CPT

## 2017-01-10 PROCEDURE — 80048 BASIC METABOLIC PNL TOTAL CA: CPT | Performed by: INTERNAL MEDICINE

## 2017-01-10 RX ADMIN — KETOROLAC TROMETHAMINE 30 MG: 30 INJECTION, SOLUTION INTRAMUSCULAR at 21:47

## 2017-01-10 RX ADMIN — MUPIROCIN: 20 OINTMENT TOPICAL at 17:32

## 2017-01-10 RX ADMIN — HYDROCODONE BITARTRATE AND ACETAMINOPHEN 2 TABLET: 7.5; 325 TABLET ORAL at 09:24

## 2017-01-10 RX ADMIN — HYDROCODONE BITARTRATE AND ACETAMINOPHEN 2 TABLET: 7.5; 325 TABLET ORAL at 13:14

## 2017-01-10 RX ADMIN — KETOROLAC TROMETHAMINE 30 MG: 30 INJECTION, SOLUTION INTRAMUSCULAR at 13:14

## 2017-01-10 RX ADMIN — HYDROCODONE BITARTRATE AND ACETAMINOPHEN 2 TABLET: 7.5; 325 TABLET ORAL at 17:32

## 2017-01-10 RX ADMIN — VANCOMYCIN HYDROCHLORIDE 1000 MG: 1 INJECTION, SOLUTION INTRAVENOUS at 03:00

## 2017-01-10 RX ADMIN — KETOROLAC TROMETHAMINE 30 MG: 30 INJECTION, SOLUTION INTRAMUSCULAR at 05:02

## 2017-01-10 RX ADMIN — HYDROCODONE BITARTRATE AND ACETAMINOPHEN 2 TABLET: 7.5; 325 TABLET ORAL at 21:47

## 2017-01-10 RX ADMIN — VANCOMYCIN HYDROCHLORIDE 1000 MG: 1 INJECTION, SOLUTION INTRAVENOUS at 15:34

## 2017-01-10 RX ADMIN — MUPIROCIN: 20 OINTMENT TOPICAL at 07:40

## 2017-01-10 RX ADMIN — ASPIRIN 325 MG: 325 TABLET, DELAYED RELEASE ORAL at 07:39

## 2017-01-10 RX ADMIN — ASPIRIN 325 MG: 325 TABLET, DELAYED RELEASE ORAL at 17:32

## 2017-01-10 RX ADMIN — HYDROCODONE BITARTRATE AND ACETAMINOPHEN 2 TABLET: 7.5; 325 TABLET ORAL at 00:28

## 2017-01-10 RX ADMIN — PANTOPRAZOLE SODIUM 40 MG: 40 TABLET, DELAYED RELEASE ORAL at 05:03

## 2017-01-10 RX ADMIN — HYDROCODONE BITARTRATE AND ACETAMINOPHEN 2 TABLET: 7.5; 325 TABLET ORAL at 04:50

## 2017-01-10 RX ADMIN — MELOXICAM 15 MG: 15 TABLET ORAL at 07:39

## 2017-01-10 NOTE — PROGRESS NOTES
Orthopedic Total Hip Progress Note      Patient: Chantell Knight  Date of Admission: 1/7/2017  YOB: 1955  Medical Record Number: [id number]    POD # :  1 Day Post-Op Procedure(s) (LRB):  LEFT TOTAL HIP ARTHROPLASTY (POSTERIOR) (Left)    Systemic or Specific Complaints: No Complaints    Pain Relief: complete resolution    Physical Exam:  61 y.o.  female  Vitals:  Temp:  [97.3 °F (36.3 °C)-100.4 °F (38 °C)] 97.7 °F (36.5 °C)  Heart Rate:  [] 89  Resp:  [10-24] 16  BP: ()/(55-85) 99/66  alert and oriented  Chest: Clear to auscultation  CV: Regular Rate and Rhythm  Abd: Soft, NT, with BS +  Ext: NV intact. ROM appropriate. Calf is soft and nontender. Negative Homans Sn  Skin: Incision clean dry and intact w/out signs or  symptoms of infection.    Activity: Mobilizing Per P.T.   Weight Bearing: As Tolerated    Data Review     Admission on 01/07/2017   No results displayed because visit has over 200 results.          Xr Hip 1 View Without Pelvis Left (surgery Only)    Result Date: 1/9/2017  Narrative: PORTAL VIEWS OF THE LEFT HIP  CLINICAL HISTORY: Postop arthroplasty  A single AP portable view demonstrates a total hip arthroplasty that appears in satisfactory position.  This report was finalized on 1/9/2017 6:25 PM by Dr. Hiren Whatley MD.      Xr Chest 1 View    Result Date: 1/7/2017  Narrative: XR CHEST 1 VW-  HISTORY: Female who is 61 years-old,  preoperative evaluation  TECHNIQUE: Frontal view of the chest  COMPARISON: None available  FINDINGS: Heart, mediastinum and pulmonary vasculature are unremarkable. No focal pulmonary consolidation, pleural effusion, or pneumothorax. No acute osseous process.      Impression: No evidence for acute pulmonary process. Follow-up as clinical indications persist.  This report was finalized on 1/7/2017 1:18 PM by Dr. Prabhu Bowen MD.      Xr Hip With Or Without Pelvis 2 - 3 View Left    Result Date: 1/7/2017  Narrative: PELVIS AND LEFT HIP X-RAY   HISTORY: Fall, left hip pain.  FINDINGS:  An AP view of the pelvis and 2 images of the left hip are provided and show an acute subcapital femoral neck fracture with varus angulation. The bone along the femoral head side of the fracture site somewhat heterogeneous appearance, lacking a clearly demarcated fracture plane. This could be due to the angle of the femoral head  on the frontal view. However, the possibility of a pathologic fracture due to underlying bone lesion should be considered. There is no evidence of bone lesion elsewhere in the pelvis, proximal femurs, or visualized lower lumbar spine. There is no other fracture. Some surgical chain sutures are observed in the pelvis.      Impression: Acute left subcapital femoral neck fracture. There is varus angulation which might account for the poor demarcation of the fracture plane along the femoral head side of the fracture. However, the possibility of an underlying bone lesion should be considered. There are no findings elsewhere to suggest other bone lesions.  This report was finalized on 1/7/2017 5:58 PM by Dr. Jose Angel Urbina MD.        Medications:    aspirin 325 mg Oral BID   docusate sodium 100 mg Oral BID   ketorolac 30 mg Intravenous Q8H   meloxicam 15 mg Oral Daily   mupirocin  Each Nare BID   pantoprazole 40 mg Oral Q AM   polyethylene glycol 17 g Oral BID   vancomycin 15 mg/kg Intravenous Q12H     •  acetaminophen  •  diphenhydrAMINE  •  HYDROcodone-acetaminophen  •  HYDROcodone-acetaminophen  •  melatonin  •  ondansetron **OR** ondansetron ODT **OR** ondansetron  •  promethazine **OR** promethazine    Assessment:  Doing well POD  # 1 Day Post-Op Procedure(s) (LRB):  LEFT TOTAL HIP ARTHROPLASTY (POSTERIOR) (Left)  Problem List Items Addressed This Visit        Musculoskeletal and Integument    * (Principal)Closed fracture of neck of left femur - Primary      Other Visit Diagnoses     Femoral neck fracture        Relevant Orders    Tissue Exam           Plan:  Posterior hip precautions  Continue efforts to mobilize  Continue Pain Control Measures  Continue incisional Care-  Do not change dressing unless saturated-  OK to leave on and shower  DVT prophylaxis -  mg po bid    Discharge Plan:Home tomorrow    Trever Marcos MD    Date: 1/10/2017  Time: 6:53 AM

## 2017-01-10 NOTE — PROGRESS NOTES
Acute Care - Physical Therapy Initial Evaluation  [facility]     Patient Name: Chantell Knight  : 1955  MRN: [id number]  Today's Date: 1/10/2017   Onset of Illness/Injury or Date of Surgery Date: 17     Referring Physician: Hemant      Admit Date: 2017     Visit Dx:    ICD-10-CM ICD-9-CM   1. Closed fracture of neck of left femur, initial encounter S72.002A 820.8   2. Femoral neck fracture S72.009A 820.8   3. Difficulty walking R26.2 719.7     Patient Active Problem List   Diagnosis   • Closed fracture of neck of left femur   • EKG abnormality   • Hyperglycemia   • Uncomplicated alcohol dependence   • Tobacco abuse   • Leukocytosis     History reviewed. No pertinent past medical history.  Past Surgical History   Procedure Laterality Date   • Colon resection left  2013     due to several ruptured diverticuli           PT ASSESSMENT (last 72 hours)      PT Evaluation       01/10/17 0901 17 1800    Rehab Evaluation    Document Type evaluation  -EE     Subjective Information agree to therapy;complains of;pain  -EE     Patient Effort, Rehab Treatment good  -EE     Symptoms Noted During/After Treatment none  -EE     General Information    Onset of Illness/Injury or Date of Surgery Date 17  -EE     Referring Physician Hemant  -EE     General Observations Adult female supine in bed in no acute distress.  -EE     Pertinent History Of Current Problem L hip fx; s/p L JAVON  -EE     Precautions/Limitations fall precautions;hip precautions- left  -EE     Prior Level of Function independent:;all household mobility;community mobility  -EE     Equipment Currently Used at Home --   has rwx she can borrow  -EE none  -MN    Plans/Goals Discussed With patient;agreed upon  -EE     Barriers to Rehab none identified  -EE     Living Environment    Lives With  alone  -MN    Living Arrangements  house  -MN    Home Accessibility  stairs to enter home  -MN    Number of Stairs to Enter Home  3  -MN    Stair  Railings at Home  outside, present at both sides  -MN    Type of Financial/Environmental Concern  none  -MN    Transportation Available  car;family or friend will provide  -MN    Clinical Impression    Patient/Family Goals Statement Go home  -EE     Criteria for Skilled Therapeutic Interventions Met yes;treatment indicated  -EE     Pathology/Pathophysiology Noted (Describe Specifically for Each System) musculoskeletal  -EE     Impairments Found (describe specific impairments) gait, locomotion, and balance  -EE     Rehab Potential good, to achieve stated therapy goals  -EE     Pain Assessment    Pain Assessment 0-10  -EE     Pain Score 3  -EE     Pain Type Acute pain;Surgical pain  -EE     Pain Location Hip  -EE     Pain Orientation Left  -EE     Pain Intervention(s) Repositioned;Cold applied;Rest  -EE     Response to Interventions tolerated  -EE     Cognitive Assessment/Intervention    Current Cognitive/Communication Assessment functional  -EE     Orientation Status oriented x 4  -EE     Follows Commands/Answers Questions 100% of the time  -EE     Personal Safety WNL/WFL  -EE     Personal Safety Interventions fall prevention program maintained;gait belt;muscle strengthening facilitated;nonskid shoes/slippers when out of bed;supervised activity  -EE     ROM (Range of Motion)    General ROM no range of motion deficits identified  -EE     General ROM Detail B UEs/LEs grossly WFL (L hip deferred-following hip precautions)  -EE     MMT (Manual Muscle Testing)    General MMT Assessment lower extremity strength deficits identified  -EE     General MMT Assessment Detail B UEs/LEs grossly WFL x L hip 3- to 3/5  -EE     Mobility Assessment/Training    Extremity Weight-Bearing Status left lower extremity  -EE     Left Lower Extremity Weight-Bearing weight-bearing as tolerated  -EE     Bed Mobility, Assessment/Treatment    Bed Mobility, Assistive Device head of bed elevated  -EE     Bed Mob, Supine to Sit, [place]  supervision required;verbal cues required  -EE     Bed Mob, Sit to Supine, Ritchie not tested   up in chair  -EE     Transfer Assessment/Treatment    Transfers, Sit-Stand Ritchie contact guard assist;verbal cues required  -EE     Transfers, Stand-Sit Ritchie contact guard assist;verbal cues required  -EE     Transfers, Sit-Stand-Sit, Assist Device rolling walker  -EE     Transfer, Comment vc's to maintain L hip precautions during transfer  -EE     Gait Assessment/Treatment    Gait, Ritchie Level contact guard assist;verbal cues required  -EE     Gait, Assistive Device rolling walker  -EE     Gait, Distance (Feet) 175  -EE     Gait, Gait Deviations angela decreased;left:;antalgic;decreased heel strike;weight-shifting ability decreased  -EE     Gait, Impairments strength decreased;pain  -EE     Motor Skills/Interventions    Additional Documentation Balance Skills Training (Group)  -EE     Balance Skills Training    Sitting-Level of Assistance Independent  -EE     Standing-Level of Assistance Close supervision  -EE     Static Standing Balance Support assistive device  -EE     Gait Balance-Level of Assistance Contact guard  -EE     Gait Balance Support assistive device  -EE     Therapy Exercises    Exercise Protocols total hip  -EE     Total Hip Exercises left:;10 reps;completed protocol  -EE     Positioning and Restraints    Pre-Treatment Position in bed  -EE     Post Treatment Position chair  -EE     In Chair reclined;call light within reach;encouraged to call for assist;legs elevated;notified nsg  -EE       User Key  (r) = Recorded By, (t) = Taken By, (c) = Cosigned By    Initials Name Provider Type    EE Stacy Lang PT Physical Therapist    TAISHA Atkins, RN Registered Nurse          Physical Therapy Education     Title: PT OT SLP Therapies (Done)     Topic: Physical Therapy (Done)     Point: Mobility training (Done)    Learning Progress Summary    Learner Readiness Method Response  Comment Documented by Status   Patient Acceptance E,TB VU,NR  EE 01/10/17 0919 Done               Point: Home exercise program (Done)    Learning Progress Summary    Learner Readiness Method Response Comment Documented by Status   Patient Acceptance E,TB VU,NR  EE 01/10/17 0919 Done               Point: Body mechanics (Done)    Learning Progress Summary    Learner Readiness Method Response Comment Documented by Status   Patient Acceptance E,TB VU,NR  EE 01/10/17 0919 Done               Point: Precautions (Done)    Learning Progress Summary    Learner Readiness Method Response Comment Documented by Status   Patient Acceptance E,TB VU,NR  EE 01/10/17 0919 Done                      User Key     Initials Effective Dates Name Provider Type Discipline    EE 12/01/15 -  Stacy Lang, PT Physical Therapist PT                PT Recommendation and Plan  Anticipated Discharge Disposition: home with home health  Planned Therapy Interventions: balance training, bed mobility training, gait training, home exercise program, patient/family education, stair training, strengthening, stretching, transfer training  PT Frequency: 2 times/day  Plan of Care Review  Plan Of Care Reviewed With: patient  Progress: improving  Outcome Summary/Follow up Plan: Pt s/p L JAVON to repair L hip fx, demonstrating post op pain and weakness limiting mobility. Pt would benefit from skilled PT to address impairments and assist w/return to PLOF. Planning to DC home tommorrow w/family to assist and follow w/HH PT;  no problems anticipated.           IP PT Goals       01/10/17 0919          Bed Mobility PT LTG    Bed Mobility PT LTG, Date Established 01/10/17  -EE      Bed Mobility PT LTG, Time to Achieve 3 days  -EE      Bed Mobility PT LTG, Activity Type all bed mobility  -EE      Bed Mobility PT LTG, [place] Level conditional independence  -EE      Transfer Training PT LTG    Transfer Training PT LTG, Date Established 01/10/17  -EE      Transfer  Training PT LTG, Time to Achieve 3 days  -EE      Transfer Training PT LTG, Activity Type all transfers  -EE      Transfer Training PT LTG, [place] Level supervision required  -EE      Transfer Training PT LTG, Assist Device walker, rolling  -EE      Gait Training PT LTG    Gait Training Goal PT LTG, Date Established 01/10/17  -EE      Gait Training Goal PT LTG, Time to Achieve 3 days  -EE      Gait Training Goal PT LTG, [place] Level supervision required  -EE      Gait Training Goal PT LTG, Assist Device walker, rolling  -EE      Gait Training Goal PT LTG, Distance to Achieve 150  -EE      Stair Training PT LTG    Stair Training Goal PT LTG, Date Established 01/10/17  -EE      Stair Training Goal PT LTG, Time to Achieve 3 days  -EE      Stair Training Goal PT LTG, Number of Steps 3  -EE      Stair Training Goal PT LTG, [place] Level contact guard assist  -EE      Stair Training Goal PT LTG, Assist Device 2 handrails  -EE      Patient Education PT LTG    Patient Education PT LTG, Date Established 01/10/17  -EE      Patient Education PT LTG, Time to Achieve 3 days  -EE      Patient Education PT LTG, Education Type HEP;precaution per surgeon  -EE      Patient Education PT LTG, Education Understanding demonstrate adequately;verbalize understanding  -EE        User Key  (r) = Recorded By, (t) = Taken By, (c) = Cosigned By    Initials Name Provider Type    EE Stacy Lang, PT Physical Therapist                Outcome Measures       01/10/17 0900          How much help from another person do you currently need...    Turning from your back to your side while in flat bed without using bedrails? 4  -EE      Moving from lying on back to sitting on the side of a flat bed without bedrails? 3  -EE      Moving to and from a bed to a chair (including a wheelchair)? 3  -EE      Standing up from a chair using your arms (e.g., wheelchair, bedside chair)? 3  -EE      Climbing 3-5 steps with a railing? 3  -EE      To walk  in hospital room? 3  -EE      AM-PAC 6 Clicks Score 19  -EE      Functional Assessment    Outcome Measure Options AM-PAC 6 Clicks Basic Mobility (PT)  -EE        User Key  (r) = Recorded By, (t) = Taken By, (c) = Cosigned By    Initials Name Provider Type    EE Stacy Lang PT Physical Therapist           Time Calculation:         PT Charges       01/10/17 0921          Time Calculation    Start Time 0901  -EE      Stop Time 0921  -EE      Time Calculation (min) 20 min  -EE      PT Received On 01/10/17  -EE      PT - Next Appointment 01/10/17  -EE      PT Goal Re-Cert Due Date 01/13/17  -EE        User Key  (r) = Recorded By, (t) = Taken By, (c) = Cosigned By    Initials Name Provider Type    AVANI Lang PT Physical Therapist          Therapy Charges for Today     Code Description Service Date Service Provider Modifiers Qty    60376331923 HC PT EVAL LOW COMPLEXITY 2 1/10/2017 Stacy Lang, PT GP 1    16561720534 HC PT THER PROC EA 15 MIN 1/10/2017 Stacy Lang PT GP 1    24269590471 HC PT THER SUPP EA 15 MIN 1/10/2017 Stacy Lang, PT GP 1          PT G-Codes  Outcome Measure Options: AM-PAC 6 Clicks Basic Mobility (PT)      Stacy Lang PT  1/10/2017

## 2017-01-10 NOTE — PLAN OF CARE
Problem: Patient Care Overview (Adult)  Goal: Plan of Care Review  Outcome: Ongoing (interventions implemented as appropriate)    01/10/17 0513 01/10/17 1627 01/10/17 1629   Coping/Psychosocial Response Interventions   Plan Of Care Reviewed With --  patient --    Patient Care Overview   Progress improving --  --    Outcome Evaluation   Outcome Summary/Follow up Plan --  --  pain is controlled with po pain medication. pt is working well with thearapy. ambulating with min assist and walker. vss, drsg c/d/i. possible d/c home with home health tomorrow.        Goal: Adult Individualization and Mutuality  Outcome: Ongoing (interventions implemented as appropriate)  Goal: Discharge Needs Assessment  Outcome: Ongoing (interventions implemented as appropriate)    Problem: Fall Risk (Adult)  Goal: Identify Related Risk Factors and Signs and Symptoms  Outcome: Ongoing (interventions implemented as appropriate)  Goal: Absence of Falls  Outcome: Ongoing (interventions implemented as appropriate)    Problem: Pressure Ulcer Risk ([place] Scale) (Adult,Obstetrics,Pediatric)  Goal: Skin Integrity  Outcome: Outcome(s) achieved Date Met:  01/10/17    Problem: Perioperative Period (Adult)  Goal: Signs and Symptoms of Listed Potential Problems Will be Absent or Manageable (Perioperative Period)  Outcome: Ongoing (interventions implemented as appropriate)

## 2017-01-10 NOTE — PROGRESS NOTES
" LOS: 3 days   Primary Care Physician: RODOLFO Doyle MD     Subjective  Feeling much better.  Pain left hip much improved.  Walking with a walker.  Anxious to go home tomorrow    Vital Signs  Body mass index is 20.98 kg/(m^2).  Temp:  [97.2 °F (36.2 °C)-98.3 °F (36.8 °C)] 97.5 °F (36.4 °C)  Heart Rate:  [72-99] 72  Resp:  [16-18] 16  BP: ()/(58-81) 106/68      Objective:  General Appearance:  In no acute distress.    Vital signs: (most recent): Blood pressure 106/68, pulse 72, temperature 97.5 °F (36.4 °C), temperature source Oral, resp. rate 16, height 66\" (167.6 cm), weight 130 lb (59 kg), SpO2 92 %.    Lungs:  Normal respiratory rate and normal effort.  Breath sounds clear to auscultation.    Heart: Normal rate.  Regular rhythm.  No murmur.   Abdomen: Abdomen is soft and non-distended.  Bowel sounds are normal.   There is no abdominal tenderness.   There is no splenomegaly. There is no hepatomegaly.   Extremities: There is dependent edema.  (No edema without teds on)        Results Review:    I reviewed the patient's new clinical results.      Results from last 7 days  Lab Units 01/10/17  0357 01/08/17  0423 01/07/17  1313   WBC 10*3/mm3  --  13.59* 12.84*   HEMOGLOBIN g/dL 11.2* 13.1 13.5   PLATELETS 10*3/mm3  --  210 265       Results from last 7 days  Lab Units 01/10/17  0357 01/08/17  0423   SODIUM mmol/L 138 135*   POTASSIUM mmol/L 3.8 3.7   CHLORIDE mmol/L 98 98   TOTAL CO2 mmol/L 26.5 22.7   BUN mg/dL 6 9   CREATININE mg/dL 0.46* 0.53*   CALCIUM mg/dL 9.0 9.3   GLUCOSE mg/dL 226* 113*       Results from last 7 days  Lab Units 01/07/17  1313   INR  0.99     Hemoglobin A1C:  Lab Results   Component Value Date    HGBA1C 4.97 01/09/2017       Glucose Range:No results found for: POCGLU    Medication Review: Yes    Physical Therapy: Doing well.  Using walker    Assessment/Plan     Active Hospital Problems (** Indicates Principal Problem)    Diagnosis Date Noted   • **Closed fracture of neck of left femur " [S72.002A] 01/07/2017   • EKG abnormality [R94.31] 01/08/2017   • Hyperglycemia [R73.9] 01/08/2017   • Uncomplicated alcohol dependence [F10.20] 01/08/2017   • Tobacco abuse [Z72.0] 01/08/2017   • Leukocytosis [D72.829] 01/08/2017      Resolved Hospital Problems    Diagnosis Date Noted Date Resolved   No resolved problems to display.       Assessment & Plan  1.  Closed fracture left femoral neck.  Status post hip replacement yesterday.  Doing well.  Probably home tomorrow.  Continue with PT.  Continue with walker  2.  Abnormal EKG, likely chronic.  Echocardiogram was fine.  Outpatient follow-up.  3.  Alcohol dependence.  No evidence of withdrawal    Disposition: Probably home tomorrow    Latasha Faulkner MD  01/10/17  6:42 PM

## 2017-01-10 NOTE — PAYOR COMM NOTE
"UR NURSE:  STACEY ZHU  P:  [phone]  F:  [phone]    Avelino Knight (61 y.o. Female)     Date of Birth Social Security Number Address Home Phone MRN    1955  [id number]  [facility] [address] [phone] [id number]    Muslim Marital Status          Anabaptist        Admission Date Admission Type Admitting Provider Attending Provider Department, Room/Bed    1/7/17 Emergency Latasha Faulkner MD Hayden, Juliana, MD [facility] [address] [place], P886/1    Discharge Date Discharge Disposition Discharge Destination                      Attending Provider: Latasha Faulkner MD     Allergies:  Penicillins    Isolation:  None   Infection:  None   Code Status:  FULL    Ht:  66\" (167.6 cm)   Wt:  130 lb (59 kg)    Admission Cmt:  None   Principal Problem:  Closed fracture of neck of left femur [S72.002A]                 Active Insurance as of 1/7/2017     Primary Coverage     Payor Plan Insurance Group Employer/Plan Group    [facility] [id number]     Payor Plan Address Payor Plan Phone Number Effective From Effective To    [address] [address] [phone] 11/6/2016     [place] [address]       Subscriber Name Subscriber Birth Date Member ID       AVELINO KNIGHT [id number]                 Emergency Contacts      (Rel.) Home Phone Work Phone Mobile Phone    James Knight (Son) -- -- [phone]               History & Physical      H&P signed by Latasha Faulkner MD at 1/8/2017  5:24 PM              CHIEF COMPLAINT: Left hip pain.     HISTORY OF PRESENT ILLNESS: This is a 61-year-old woman who is unknown to our service. Last night she was in the driveway and slipped on some black ice. Her legs went completely out from under her, and she landed on the left hip. It was painful, but she was able to get up and walk around. At 3 a.m. the pain was even worse. Her son came to check on her, and she had to get up to unlock the door to let him in. She was able to do it, but when she " got back to bed she was sweating and felt queasy. A little after that, her brother came, and she again got up to let him in. As she was walking, she heard something pop, and then her leg gave out from under her. She was not able to bear weight after that. The pain is severe. It is along the anterior left thigh. She had some numbness of the left foot last night but not now. She did just get some IV pain medications and that has helped. No other associated symptoms. No exacerbating or alleviating factors otherwise.     PAST MEDICAL HISTORY:   1. In 2013 she had partial colectomy for ruptured diverticula.   2. Left toe fracture. She had a bone density test about 10 years ago, and it was fine.   3. ADD.     HOME MEDICATIONS:  1. Adderall 10 mg daily.   2. Excedrin Migraine p.r.n.   3. B complex vitamin daily.   4. Multivitamin daily.   5. Omega-3 take 2000 mg daily.     ALLERGIES: PENICILLIN (sensitivity).      SOCIAL HISTORY: She is  and lives alone. She works as an  for [employer]. Her job is mostly sedentary. She smokes a half pack a day and has 4 beers daily.     FAMILY HISTORY: Negative for osteoporosis. Positive for heart disease and hypertension.     REVIEW OF SYSTEMS: No sore throat. No earache. No vision changes. No hearing changes. She has occasional hot flashes and night sweats. She feels very hot right now. No change in her hair texture or skin. No vision changes. No hearing changes. No chest pain. No palpitations. No PND. No orthopnea. No ankle swelling. No cough or wheezing. No shortness of breath. She is fairly active. No palpitations. In general, no nausea, vomiting, diarrhea or constipation. No dysuria. No polyuria. No polydipsia. No rash or skin changes. Her weight has been stable. Her appetite is good. No dizziness or lightheadedness typically. She does have loose stools at times, sometimes twice a day. This started after the partial colectomy 3 years ago. The rest of the 10-point  review of systems is otherwise negative.     PHYSICAL EXAMINATION:  GENERAL: She looks a little uncomfortable but in no acute distress. Alert, cooperative and appropriate. She looks her stated age, neatly groomed, well-developed. Her face is flushed.   VITAL SIGNS: Temperature 98.2, pulse 71, respirations 18, blood pressure 131/97, weight 130 pounds, O2 saturation 93% on room air.   HEENT: Normocephalic, atraumatic. Her face is flushed as noted above. Pupils equal, round and reactive to light and accommodation. No nystagmus. Extraocular movements intact. Lids and conjunctivae normal without ptosis or icterus. Oropharynx clear. No thrush. No masses or lesions. Dentition intact. External ears and nose are normal on inspection. Hearing is intact.   NECK: Supple without lymphadenopathy, thyromegaly, JVD or bruit. Trachea midline.   HEART: Regular without murmur, rub or gallop. Normal S1, S2.   LUNGS: Clear. Breath sounds equal. No usage of accessory muscles of respiration. No wheezing or crackles.   ABDOMEN: Soft, nontender. Positive bowel sounds. No hepatosplenomegaly, rebound, guarding or mass.   EXTREMITIES: No clubbing, cyanosis or edema. Her palms and dorsal aspects of the hands are flushed. She did have slight tremor of her fingers with both hands outstretched.   VASCULAR: Pedal, radial and carotid pulses easily palpable and symmetric.   SKIN: No rash, ulceration or nodule.   NEUROLOGIC: Cranial nerves intact. Muscle strength: Upper extremities intact to gravity. Lower extremities not tested secondary to fracture.     DIAGNOSTIC DATA: Glucose 111, BUN 14, creatinine 0.6, sodium 140, potassium 4, chloride 101, CO2 of 23, albumin 4.4, alkaline phosphatase elevated at 118, LFTs otherwise normal. INR 1. White count 12.8, hemoglobin 13.5, platelets 265,000, with 81 segs, 10 lymphs, MCV up at 99. TSH pending. Plain films show acute left subcapital femoral neck fracture, varus angulation. Note that underlying bone lesion  is a possibility. Please see report for complete details. Plain film of the chest: No infiltrate or effusion. I have reviewed her plain films. EKG on my review: Sinus with a rate of about 75. Incomplete right bundle. No previous tracings.     IMPRESSION/PLAN:  1. Left subcapital femur fracture. Orthopedics has been consulted through the ER. She is at acceptable risk and ready for surgery when OR time is available. _____ her abnormal EKG. There are no acute findings, and she has no clinical symptoms of heart failure or ischemia. She will need an echocardiogram which we can do during her stay here.   2. Excess alcohol usage. I discussed this with the patient. We will watch for any signs of withdrawal. I have ordered a TSH to make sure she does not have any overactive thyroid.   3. Slight tremor as noted above. No evidence of alcohol withdrawal. This could be secondary to the use of Adderall. We will hold that for now.   4. Cigarette smoking without any symptoms or signs of COPD. She has been advised to quit.   5. Leukocytosis, likely reactive.   6. History of partial colectomy for ruptured diverticula in 2013.         Latasha Faulkner M.D.  JH:bhavya  D:   01/07/2017 15:02:49  T:   01/07/2017 15:34:45  Job ID:   [id number]  Document ID:   [id number]  cc:        Electronically signed by Latasha Faulkner MD at 1/8/2017  5:24 PM      Latasha Faulkner MD at 1/7/2017  2:10 PM          Dictated    1. Left subcap femur fracture    2. cigs  3. etoh  4. Abn ekg  5. 2013 partial colectomy for ruptured tics    [id number]     Electronically signed by Latasha Faulkner MD at 1/7/2017  3:03 PM           Emergency Department Notes      Hiren Croew MD at 1/7/2017 11:20 AM           EMERGENCY DEPARTMENT ENCOUNTER    CHIEF COMPLAINT  Chief Complaint: Fall, hip pain  History given by: Pt  History limited by: N/A  Room Number: 21/21  PMD: RODOLFO Doyle MD      HPI:  Pt is a 61 y.o. female who presents complaining of fall and subsequent L hip pain  after slipping on ice in her driveway last night. Pt reports pain following the event, which radiates down her L leg and was worse when she woke this morning. Pt reports feeling a pop this morning while walking. Her pain is severe and worsens with movement. Pt also c/o L leg numbness. She smokes 1/2 pack per day and drinks 4 beers after work daily. Pt denies fever, headache, neck pain, back pain, vomiting, diarrhea, black/bloody stool, or any other injury at this time.    Duration:  1 day  Onset: Sudden  Timing: Constant  Location: L hip  Radiation: L leg  Quality: Aching  Intensity/Severity: Severe  Progression: Unchanged  Associated Symptoms: L leg numbness  Aggravating Factors: Movement, standing  Alleviating Factors: Nothing  Previous Episodes: No  Treatment before arrival: None specified    PAST MEDICAL HISTORY  Active Ambulatory Problems     Diagnosis Date Noted   • No Active Ambulatory Problems     Resolved Ambulatory Problems     Diagnosis Date Noted   • No Resolved Ambulatory Problems     No Additional Past Medical History       PAST SURGICAL HISTORY  Past Surgical History   Procedure Laterality Date   • Colon resection left  06/2013     due to several ruptured diverticuli        FAMILY HISTORY  History reviewed. No pertinent family history.    SOCIAL HISTORY  Social History     Social History   • Marital status:      Spouse name: N/A   • Number of children: N/A   • Years of education: N/A     Occupational History   • Not on file.     Social History Main Topics   • Smoking status: Current Every Day Smoker     Packs/day: 0.25     Types: Cigarettes   • Smokeless tobacco: Not on file   • Alcohol use 1.2 - 1.8 oz/week     2 - 3 Cans of beer per week      Comment: per day    • Drug use: No   • Sexual activity: Defer     Other Topics Concern   • Not on file     Social History Narrative       ALLERGIES  Penicillins    REVIEW OF SYSTEMS  Review of Systems   Constitutional: Negative.  Negative for fever and  unexpected weight change.   HENT: Negative.    Respiratory: Negative.    Cardiovascular: Negative.    Gastrointestinal: Negative.  Negative for blood in stool, diarrhea and vomiting.   Genitourinary: Negative.    Musculoskeletal: Positive for arthralgias (L hip). Negative for back pain and neck pain.   Neurological: Positive for numbness (L leg). Negative for headaches.   All other systems reviewed and are negative.      PHYSICAL EXAM  ED Triage Vitals   Temp Heart Rate Resp BP SpO2   01/07/17 1118 01/07/17 1118 01/07/17 1118 01/07/17 1118 01/07/17 1118   98.2 °F (36.8 °C) 71 18 149/91 97 %      Temp src Heart Rate Source Patient Position BP Location FiO2 (%)   01/07/17 1118 01/07/17 1118 -- -- --   Oral Monitor          Physical Exam   Constitutional: She is oriented to person, place, and time.   HENT:   Head: Normocephalic and atraumatic.   Mouth/Throat: Oropharynx is clear and moist.   Eyes: EOM are normal. Pupils are equal, round, and reactive to light.   Neck: Normal range of motion. Neck supple.   Cardiovascular: Normal rate, regular rhythm and normal heart sounds.    Pulmonary/Chest: Effort normal and breath sounds normal. No respiratory distress.   Abdominal: Soft. There is no tenderness. There is no rebound and no guarding.   Musculoskeletal: She exhibits no edema.        Right hip: Normal.        Left hip: She exhibits decreased range of motion (secondary to pain), tenderness, bony tenderness and deformity (shortening and external rotation).   Neurological: She is alert and oriented to person, place, and time. She has normal sensation and normal strength.   Skin: Skin is warm and dry. No rash noted.   Psychiatric: Mood and affect normal.   Nursing note and vitals reviewed.      LAB RESULTS  Lab Results (last 24 hours)     Procedure Component Value Units Date/Time    CBC & Differential [[id number]] Collected:  01/07/17 1313    Specimen:  Blood Updated:  01/07/17 1331    Narrative:       The following orders  were created for panel order CBC & Differential.  Procedure                               Abnormality         Status                     ---------                               -----------         ------                     CBC Auto Differential[[id number]]         Abnormal            Final result                 Please view results for these tests on the individual orders.    Comprehensive Metabolic Panel [[id number]]  (Abnormal) Collected:  01/07/17 1313    Specimen:  Blood Updated:  01/07/17 1354     Glucose 111 (H) mg/dL      BUN 14 mg/dL      Creatinine 0.55 (L) mg/dL      Sodium 140 mmol/L      Potassium 4.0 mmol/L      Chloride 101 mmol/L      CO2 23.0 mmol/L      Calcium 9.3 mg/dL      Total Protein 7.1 g/dL      Albumin 4.40 g/dL      ALT (SGPT) 23 U/L      AST (SGOT) 23 U/L      Alkaline Phosphatase 118 (H) U/L      Total Bilirubin 0.5 mg/dL      eGFR Non African Amer 113 mL/min/1.73      Globulin 2.7 gm/dL      A/G Ratio 1.6 g/dL      BUN/Creatinine Ratio 25.5 (H)      Anion Gap 16.0 mmol/L     Protime-INR [[id number]]  (Normal) Collected:  01/07/17 1313    Specimen:  Blood Updated:  01/07/17 1339     Protime 12.7 Seconds      INR 0.99     CBC Auto Differential [[id number]]  (Abnormal) Collected:  01/07/17 1313    Specimen:  Blood Updated:  01/07/17 1331     WBC 12.84 (H) 10*3/mm3      RBC 4.09 10*6/mm3      Hemoglobin 13.5 g/dL      Hematocrit 40.4 %      MCV 98.8 (H) fL      MCH 33.0 (H) pg      MCHC 33.4 g/dL      RDW 12.8 %      RDW-SD 46.3 fl      MPV 10.2 fL      Platelets 265 10*3/mm3      Neutrophil % 81.4 (H) %      Lymphocyte % 10.4 (L) %      Monocyte % 6.7 %      Eosinophil % 1.0 %      Basophil % 0.3 %      Immature Grans % 0.2 %      Neutrophils, Absolute 10.46 (H) 10*3/mm3      Lymphocytes, Absolute 1.33 10*3/mm3      Monocytes, Absolute 0.86 10*3/mm3      Eosinophils, Absolute 0.13 10*3/mm3      Basophils, Absolute 0.04 10*3/mm3      Immature Grans, Absolute 0.02 10*3/mm3           I ordered the  above labs and reviewed the results    RADIOLOGY  XR Chest 1 View   Final Result   No evidence for acute pulmonary process. Follow-up as   clinical indications persist.       This report was finalized on 1/7/2017 1:18 PM by Dr. Prabhu Bowen MD.          XR Hip With or Without Pelvis 2 - 3 View Left   Preliminary Result   Acute left subcapital femoral neck fracture. There is varus   angulation which might account for the poor demarcation of the fracture   plane along the femoral head side of the fracture. However, the   possibility of an underlying bone lesion should be considered. There are   no findings elsewhere to suggest other bone lesions.             I ordered the above noted radiological studies. Interpreted by radiologist. Discussed with radiologist. Reviewed by me in PACS.     EKG         EKG time: 13:11  Rhythm/Rate: NSR at 78 bpm  P waves and ME: Normal  QRS, axis: RBBB  ST and T waves: Unremarkable  Interpreted contemporaneously by me and independently viewed.  No prior EKG for comparison.    PROCEDURES  Procedures      PROGRESS AND CONSULTS  ED Course   Comment By Time   11:29 AM  Pt with fall last night.  L hip pain increased over night.  Now unable to bear weight.  Exam suggests femoral fx.  Will give IV DZ for pain. Hiren Crowe MD 01/07 1130   11:22 AM:  Vitals: BP: 149/91 HR: 71 Temp: 98.2 °F (36.8 °C) (Oral) O2 sat: 97%  D/w pt plan for XR L Hip for further evaluation. Ordered Dilaudid and Zofran for pain management. Pt understands and agrees with the plan, all questions answered.    1:04 PM:  Call placed to [facility] for admission, Ordered labs, CXR, and EKG for further evaluation.    1:15 PM:  Vitals: BP: 139/87 HR: 71 Temp: 98.2 °F (36.8 °C) (Oral) O2 sat: 94%  Rechecked pt. Pt is resting comfortably. Discussed results of imaging, which showed fx, and the need for admission for further care. Pt understands and agrees with the plan, all questions answered.    1:30 PM:  Discussed pt's case  with Dr. Latasha Faulkner ([facility]), who agrees to admit the pt for further care. Call placed to Ortho for consult.    2:06 PM:  Vitals: BP: 142/82 HR: 71 Temp: 98.2 °F (36.8 °C) (Oral) O2 sat: 95%  Rechecked pt. Pt is resting comfortably. Discussed that I have not heard from ortho, but that I have spoken to Dr. Faulkner, who will admit the pt. Pt understands and agrees with the plan, all questions answered.    2:14 PM:  Discussed pt's case with Dr. Horvath ([facility]), who agrees to consult with the pt upon admission.    MEDICAL DECISION MAKING  Results were reviewed/discussed with the patient and they were also made aware of online access. Pt also made aware that some labs, such as cultures, will not be resulted during ER visit and follow up with PMD is necessary.     MDM  Number of Diagnoses or Management Options  Closed fracture of neck of left femur, initial encounter:      Amount and/or Complexity of Data Reviewed  Clinical lab tests: ordered and reviewed  Tests in the radiology section of CPT®: ordered and reviewed  Tests in the medicine section of CPT®: ordered and reviewed  Discussion of test results with the performing providers: yes  Discuss the patient with other providers: yes  Independent visualization of images, tracings, or specimens: yes    Patient Progress  Patient progress: stable         DIAGNOSIS  Final diagnoses:   Closed fracture of neck of left femur, initial encounter       DISPOSITION  ADMISSION    Discussed treatment plan and reason for admission with pt/family and admitting physician.  Pt/family voiced understanding of the plan for admission for further testing/treatment as needed.     Latest Documented Vital Signs:  As of 2:14 PM  BP- 142/82 HR- 71 Temp- 98.2 °F (36.8 °C) (Oral) O2 sat- 95%    --  Documentation assistance provided by rody Zelaya for Dr. Crowe.  Information recorded by the rody was done at my direction and has been verified and validated by me.     Jd  Garcia  01/07/17 1414       Hiren Crowe MD  01/07/17 1422       Electronically signed by Hiren Crowe MD at 1/7/2017  2:22 PM           Operative/Procedure Notes       Trever Marcos MD at 1/9/2017  5:50 PM  Version 1 of 1         Name: Chantell Knight  YOB: 1955    DATE OF SURGERY: 1/9/2017    PREOPERATIVE DIAGNOSIS: Left hip femoral neck fracture    POSTOPERATIVE DIAGNOSIS: Left hip femoral neck fracture    PROCEDURE PERFORMED: Left  total hip replacement    SURGEON: Trever Marcos M.D.    ASSISTANT: LILLI RAUSCH    IMPLANTS:    Implant Name Type Inv. Item Serial No.  Lot No. LRB No. Used   LINER ACET R3 XLPE 20D [id number] - [id number] Implant LINER ACET R3 XLPE 20D [id number]  [employer] [id number] Left 1   SHLL ACET R3 3H STD 50MM - [id number] Implant SHLL ACET R3 3H STD 50MM  [employer] [id number] Left 1   SCRW SPH HD REFLECTION 6.5X30MM - [id number] Implant SCRW SPH HD REFLECTION 6.5X30MM  [employer] [id number] Left 1   SCRW SPH HD REFLECTION 6.5X25MM - [id number] Implant SCRW SPH HD REFLECTION 6.5X25MM  [employer] [id number] Left 1   stem standardwith TI/HA    [employer] [id number] Left 1   HD FEM OXINIUM TPR 12 14 32MM PLS8 - [id number] Implant HD FEM OXINIUM TPR 12 14 32MM PLS8   [employer] [id number] Left 1       Estimated Blood Loss: 200cc  Specimens : none  Complications: none    DESCRIPTION OF PROCEDURE:    The patient was taken to the operating room and placed in the supine position. A sequential compression device was carefully placed on the non-operative leg. Preoperative antibiotics were administered. Surgical time out was performed. After adequate induction of anesthesia the patient was then transferred onto the  table and positioned appropriately in the lateral decubitus position. The hip was then prepped and draped in the usual sterile fashion.    A posterior lateral surgical incision was then made.  The gluteus vick fascia was then  divided the gluteus vick muscle was then bluntly dissected.  A Charnley self-retaining retractor was then placed.  A posterior capsulotomy was then performed.  tHE INTRAARTICULAR HEMATOMA WAS EVACUATED. The superior limb was divided in line with the piriformis tendon.  The hip was then dislocated.  The femoral neck osteotomy was performed according to the template.  The femoral head was removed.  The head and neck fragments were evaluated there is no evidence of obvious pathologic lesion.  The acetabulum was exposed with standard retractors.  The labrum and pulvinar were then excised.  The cup was then medialized with the starting acetabular reamer to the medial wall.  I then progressively reamed up to the appropriate size and 45° of abduction and 20° of anteversion. Line to line reaming was performed. At this point the bone was nicely prepared there was excellent bleeding bone. We then impacted the acetabular component in 45 of abduction and 20 of anteversion the cup was stable.  Per routine we augmented the fixation with 2 screws in the posterior and superior quadrant  The final liner was then placed and it locked in nicely.  At this point we injected the hip with anesthetic cocktail solution.  We then turned our attention to the femur.   Standard retractors were placed to expose the femur.  The box osteotome was used to create a starting point.  The canal finder was then used to sound the canal.  The lateralizing reamer was then used to lateralize into the greater trochanter.  We progressively reamed and broached until the broach was very solid to axial and rotational stresses.  At this point we placed the trial neck and head hip was very stable to flexion and internal rotation as well as extension and external rotation.  The leg lengths were measured to be equal.  We then removed the trial components,copiously irrigated the hip, and then impacted the final stem.  At this point we then trialed and chose the  "final head. The head was placed on a clean dry taper.  The leg lengths were again measured to be equal.  At this point the hip was copiously irrigated with pulse lavage and the capsule was then reapproximated with #1 PDS suture, and the remainder of the hip was closed in multiple layers in standard fashion..  There was excellent hemostasis. We placed a one-eighth inch Hemovac drain.  Sterile dressing were applied. At the end of the case, the sponge and needle counts were reported as being correct. There were no known complications. The patient was then transported to the recovery room.      Trever Marcos M.D.  1/9/2017       Electronically signed by Trever Marcos MD at 1/9/2017  6:00 PM           Physician Progress Notes      Latasha Faulkner MD at 1/8/2017  5:30 PM  Version 1 of 1          LOS: 1 day   Primary Care Physician: RODOLFO Doyle MD     Subjective  Pain is been back in today.  Just got back from echo.  No shortness of breath.  No dizziness.  Eating dinner now    Vital Signs  Body mass index is 20.98 kg/(m^2).  Temp:  [97.2 °F (36.2 °C)-99.1 °F (37.3 °C)] 99.1 °F (37.3 °C)  Heart Rate:  [74-93] 93  Resp:  [16-18] 16  BP: (120-154)/(70-88) 124/74      Objective:  General Appearance:  In no acute distress.    Vital signs: (most recent): Blood pressure 124/74, pulse 93, temperature 99.1 °F (37.3 °C), temperature source Oral, resp. rate 16, height 66\" (167.6 cm), weight 130 lb (59 kg), SpO2 91 %.    Lungs:  There are decreased breath sounds.  No wheezes, rales or rhonchi.    Heart: Normal rate.  Regular rhythm.  No murmur.   Abdomen: Abdomen is soft and non-distended.  There is no abdominal tenderness.   There is no splenomegaly. There is no hepatomegaly.   Extremities: There is no dependent edema.    Neurological: Patient is alert.          Results Review:    I reviewed the patient's new clinical results.      Results from last 7 days  Lab Units 01/08/17  0423 01/07/17  1313   WBC 10*3/mm3 13.59* 12.84* "   HEMOGLOBIN g/dL 13.1 13.5   PLATELETS 10*3/mm3 210 265       Results from last 7 days  Lab Units 01/08/17  0423 01/07/17  1313   SODIUM mmol/L 135* 140   POTASSIUM mmol/L 3.7 4.0   CHLORIDE mmol/L 98 101   TOTAL CO2 mmol/L 22.7 23.0   BUN mg/dL 9 14   CREATININE mg/dL 0.53* 0.55*   CALCIUM mg/dL 9.3 9.3   GLUCOSE mg/dL 113* 111*       Results from last 7 days  Lab Units 01/07/17  1313   INR  0.99     Hemoglobin A1C:No results found for: HGBA1C    Glucose Range:No results found for: POCGLU    Medication Review: Yes    Physical Therapy:    Assessment&#47;Plan     Active Hospital Problems (** Indicates Principal Problem)    Diagnosis Date Noted   • **Closed fracture of neck of left femur [S72.002A] 01/07/2017   • EKG abnormality [R94.31] 01/08/2017   • Hyperglycemia [R73.9] 01/08/2017   • Uncomplicated alcohol dependence [F10.20] 01/08/2017   • Tobacco abuse [Z72.0] 01/08/2017   • Leukocytosis [D72.829] 01/08/2017      Resolved Hospital Problems    Diagnosis Date Noted Date Resolved   No resolved problems to display.       Assessment & Plan   Left subcapital femur fracture with dislocation.  For hip replacement tomorrow.  Continue pain medications as needed  2.  Abnormal EKG.  No symptoms or signs of heart failure or ischemia.  Echo done today.  Acceptable risk to proceed tomorrow with surgery.  3.  Alcohol dependence.  No sign of withdrawal  4.  Leukocytosis, probably reactive.  Mild  5.  Hyperglycemia.  Will check hemoglobin A1c.  Cigarette smoking      Latasha Faulkner MD  01/08/17  5:36 PM               Electronically signed by Latasha Faulkner MD at 1/8/2017  5:37 PM      Latasha Faulkner MD at 1/9/2017 11:59 AM  Version 2 of 2          LOS: 2 days   Primary Care Physician: RODOLFO Doyle MD     Subjective  Pain is still severe at times.  Pain medicines take the edge off.  Anxious to get the surgery done.    Vital Signs  Body mass index is 20.98 kg/(m^2).  Temp:  [99.1 °F (37.3 °C)-100.8 °F (38.2 °C)] 100.4 °F (38  "°C)  Heart Rate:  [] 97  Resp:  [16-18] 18  BP: (100-134)/(66-85) 134/85      Objective:  General Appearance:  In no acute distress.    Vital signs: (most recent): Blood pressure 134/85, pulse 97, temperature 100.4 °F (38 °C), temperature source Oral, resp. rate 18, height 66\" (167.6 cm), weight 130 lb (59 kg), SpO2 94 %.    Lungs:  There are decreased breath sounds.  No wheezes, rales or rhonchi.    Heart: Normal rate.  Regular rhythm.  No murmur.   Abdomen: Abdomen is soft and non-distended.  There is no abdominal tenderness.   There is no splenomegaly. There is no hepatomegaly.   Extremities: There is no dependent edema.  (No tremor)  Neurological: Patient is alert.          Results Review:    I reviewed the patient's new clinical results.      Results from last 7 days  Lab Units 01/08/17  0423 01/07/17  1313   WBC 10*3/mm3 13.59* 12.84*   HEMOGLOBIN g/dL 13.1 13.5   PLATELETS 10*3/mm3 210 265       Results from last 7 days  Lab Units 01/08/17  0423 01/07/17  1313   SODIUM mmol/L 135* 140   POTASSIUM mmol/L 3.7 4.0   CHLORIDE mmol/L 98 101   TOTAL CO2 mmol/L 22.7 23.0   BUN mg/dL 9 14   CREATININE mg/dL 0.53* 0.55*   CALCIUM mg/dL 9.3 9.3   GLUCOSE mg/dL 113* 111*       Results from last 7 days  Lab Units 01/07/17  1313   INR  0.99     Hemoglobin A1C:No results found for: HGBA1C    Glucose Range:No results found for: POCGLU    Medication Review: Yes    Physical Therapy:    Assessment&#47;Plan     Active Hospital Problems (** Indicates Principal Problem)    Diagnosis Date Noted   • **Closed fracture of neck of left femur [S72.002A] 01/07/2017   • EKG abnormality [R94.31] 01/08/2017   • Hyperglycemia [R73.9] 01/08/2017   • Uncomplicated alcohol dependence [F10.20] 01/08/2017   • Tobacco abuse [Z72.0] 01/08/2017   • Leukocytosis [D72.829] 01/08/2017      Resolved Hospital Problems    Diagnosis Date Noted Date Resolved   No resolved problems to display.       Assessment & Plan  1.  Left subcapital femur " "fracture with dislocation.  Total hip replacement today.  2.  Abnormal EKG although echo normal.  No further workup needed.  No acute issues  3.  Low-grade fever last night, likely secondary to fracture plus minus atelectasis.  Start incentive spirometry.  Recheck labs in a.m.  Mild leukocytosis noted, reactive.      Latasha Faulkner MD  01/09/17  11:59 AM               Electronically signed by Latasha Faulkner MD at 1/9/2017 12:16 PM      Latasha Faulkner MD at 1/9/2017 11:59 AM  Version 1 of 2          LOS: 2 days   Primary Care Physician: RODOLFO Doyle MD     Subjective  Pain is still severe at times.  Pain medicines take the edge off.  Anxious to get the surgery done.    Vital Signs  Body mass index is 20.98 kg/(m^2).  Temp:  [99.1 °F (37.3 °C)-100.8 °F (38.2 °C)] 100.4 °F (38 °C)  Heart Rate:  [] 97  Resp:  [16-18] 18  BP: (100-134)/(66-85) 134/85      Objective:  General Appearance:  In no acute distress.    Vital signs: (most recent): Blood pressure 134/85, pulse 97, temperature 100.4 °F (38 °C), temperature source Oral, resp. rate 18, height 66\" (167.6 cm), weight 130 lb (59 kg), SpO2 94 %.    Lungs:  There are decreased breath sounds.  No wheezes, rales or rhonchi.    Heart: Normal rate.  Regular rhythm.  No murmur.   Abdomen: Abdomen is soft and non-distended.  There is no abdominal tenderness.   There is no splenomegaly. There is no hepatomegaly.   Extremities: There is no dependent edema.  (No tremor)  Neurological: Patient is alert.          Results Review:    I reviewed the patient's new clinical results.      Results from last 7 days  Lab Units 01/08/17  0423 01/07/17  1313   WBC 10*3/mm3 13.59* 12.84*   HEMOGLOBIN g/dL 13.1 13.5   PLATELETS 10*3/mm3 210 265       Results from last 7 days  Lab Units 01/08/17  0423 01/07/17  1313   SODIUM mmol/L 135* 140   POTASSIUM mmol/L 3.7 4.0   CHLORIDE mmol/L 98 101   TOTAL CO2 mmol/L 22.7 23.0   BUN mg/dL 9 14   CREATININE mg/dL 0.53* 0.55*   CALCIUM mg/dL " 9.3 9.3   GLUCOSE mg/dL 113* 111*       Results from last 7 days  Lab Units 01/07/17  1313   INR  0.99     Hemoglobin A1C:No results found for: HGBA1C    Glucose Range:No results found for: POCGLU    Medication Review: Yes    Physical Therapy:    Assessment&#47;Plan     Active Hospital Problems (** Indicates Principal Problem)    Diagnosis Date Noted   • **Closed fracture of neck of left femur [S72.002A] 01/07/2017   • EKG abnormality [R94.31] 01/08/2017   • Hyperglycemia [R73.9] 01/08/2017   • Uncomplicated alcohol dependence [F10.20] 01/08/2017   • Tobacco abuse [Z72.0] 01/08/2017   • Leukocytosis [D72.829] 01/08/2017      Resolved Hospital Problems    Diagnosis Date Noted Date Resolved   No resolved problems to display.       Assessment & Plan  1.  Left subcapital femur fracture with dislocation.  Total hip replacement today.  2.  Abnormal EKG although echo normal.  No further workup needed.    3.  Low-grade fever last night, likely secondary to fracture plus minus atelectasis.  Start incentive spirometry.  Recheck labs in a.m.  Mild leukocytosis noted, reactive.      Latasha Faulkner MD  01/09/17  11:59 AM               Electronically signed by Latasha Faulkner MD at 1/9/2017 12:06 PM      Trever Marcos MD at 1/10/2017  6:52 AM  Version 1 of 1         Orthopedic Total Hip Progress Note      Patient: Chantell Knight  Date of Admission: 1/7/2017  YOB: 1955  Medical Record Number: [id number]    POD # :  1 Day Post-Op Procedure(s) (LRB):  LEFT TOTAL HIP ARTHROPLASTY (POSTERIOR) (Left)    Systemic or Specific Complaints: No Complaints    Pain Relief: complete resolution    Physical Exam:  61 y.o.  female  Vitals:  Temp:  [97.3 °F (36.3 °C)-100.4 °F (38 °C)] 97.7 °F (36.5 °C)  Heart Rate:  [] 89  Resp:  [10-24] 16  BP: ()/(55-85) 99/66  alert and oriented  Chest: Clear to auscultation  CV: Regular Rate and Rhythm  Abd: Soft, NT, with BS +  Ext: NV intact. ROM appropriate. Calf is soft and  nontender. Negative Homans Sn  Skin: Incision clean dry and intact w/out signs or  symptoms of infection.    Activity: Mobilizing Per P.T.   Weight Bearing: As Tolerated    Data Review     Admission on 01/07/2017   No results displayed because visit has over 200 results.          Xr Hip 1 View Without Pelvis Left (surgery Only)    Result Date: 1/9/2017  Narrative: PORTAL VIEWS OF THE LEFT HIP  CLINICAL HISTORY: Postop arthroplasty  A single AP portable view demonstrates a total hip arthroplasty that appears in satisfactory position.  This report was finalized on 1/9/2017 6:25 PM by Dr. Hiren Whatley MD.      Xr Chest 1 View    Result Date: 1/7/2017  Narrative: XR CHEST 1 VW-  HISTORY: Female who is 61 years-old,  preoperative evaluation  TECHNIQUE: Frontal view of the chest  COMPARISON: None available  FINDINGS: Heart, mediastinum and pulmonary vasculature are unremarkable. No focal pulmonary consolidation, pleural effusion, or pneumothorax. No acute osseous process.      Impression: No evidence for acute pulmonary process. Follow-up as clinical indications persist.  This report was finalized on 1/7/2017 1:18 PM by Dr. Prabhu Bowen MD.      Xr Hip With Or Without Pelvis 2 - 3 View Left    Result Date: 1/7/2017  Narrative: PELVIS AND LEFT HIP X-RAY  HISTORY: Fall, left hip pain.  FINDINGS:  An AP view of the pelvis and 2 images of the left hip are provided and show an acute subcapital femoral neck fracture with varus angulation. The bone along the femoral head side of the fracture site somewhat heterogeneous appearance, lacking a clearly demarcated fracture plane. This could be due to the angle of the femoral head  on the frontal view. However, the possibility of a pathologic fracture due to underlying bone lesion should be considered. There is no evidence of bone lesion elsewhere in the pelvis, proximal femurs, or visualized lower lumbar spine. There is no other fracture. Some surgical chain sutures are  observed in the pelvis.      Impression: Acute left subcapital femoral neck fracture. There is varus angulation which might account for the poor demarcation of the fracture plane along the femoral head side of the fracture. However, the possibility of an underlying bone lesion should be considered. There are no findings elsewhere to suggest other bone lesions.  This report was finalized on 1/7/2017 5:58 PM by Dr. Jose Angel Urbina MD.        Medications:    aspirin 325 mg Oral BID   docusate sodium 100 mg Oral BID   ketorolac 30 mg Intravenous Q8H   meloxicam 15 mg Oral Daily   mupirocin  Each Nare BID   pantoprazole 40 mg Oral Q AM   polyethylene glycol 17 g Oral BID   vancomycin 15 mg/kg Intravenous Q12H     •  acetaminophen  •  diphenhydrAMINE  •  HYDROcodone-acetaminophen  •  HYDROcodone-acetaminophen  •  melatonin  •  ondansetron **OR** ondansetron ODT **OR** ondansetron  •  promethazine **OR** promethazine    Assessment:  Doing well POD  # 1 Day Post-Op Procedure(s) (LRB):  LEFT TOTAL HIP ARTHROPLASTY (POSTERIOR) (Left)  Problem List Items Addressed This Visit        Musculoskeletal and Integument    * (Principal)Closed fracture of neck of left femur - Primary      Other Visit Diagnoses     Femoral neck fracture        Relevant Orders    Tissue Exam          Plan:  Posterior hip precautions  Continue efforts to mobilize  Continue Pain Control Measures  Continue incisional Care-  Do not change dressing unless saturated-  OK to leave on and shower  DVT prophylaxis -  mg po bid    Discharge Plan:Home tomorrow    Trever Marcos MD    Date: 1/10/2017  Time: 6:53 AM     Electronically signed by Terver Marcos MD at 1/10/2017  6:54 AM           Consult Notes      Trever Marcos MD at 1/8/2017  8:21 AM  Version 2 of 2     Consult Orders:    1. Ortho (on-call MD unless specified) [[id number]] ordered by Hiren Crowe MD at 01/07/17 1331                   Orthopedic Consult      Patient: Chantell Knight    Date of  Admission: 1/7/2017 11:19 AM    YOB: 1955    Medical Record Number: [id number]    Consulting Physician: Latasha Faulkner MD    Chief Complaints: Left femoral neck fracture    History of Present Illness: 61 y.o. female admitted to [facility] to services of Latasha Faulkner MD with a displaced femoral neck fracture. Patient reports that injury was sustained in a fall. Landed awkwardly on the affected hip. Noticed immediate pain and inability to bear weight on the leg. Was brought to the ER and diagnosed with a displaced femoral neck fracture. Localizes the pain to the groin. Describes pain as severe, constant, worse with any movement but alleviated by rest. Denies LOC. Denies any other associated injury or complaint    Allergies:   Allergies   Allergen Reactions   • Penicillins Diarrhea       Medications:   Home Medications:    Current Facility-Administered Medications:   •  acetaminophen (TYLENOL) tablet 650 mg, 650 mg, Oral, Q4H PRN, Latasha Faulkner MD  •  aspirin-acetaminophen-caffeine (EXCEDRIN MIGRAINE) per tablet 1 tablet, 1 tablet, Oral, Q6H PRN, Latasha Faulkner MD  •  b complex-C-folic acid capsule 1 mg, 1 capsule, Oral, Daily, Latasha Faulkner MD, 1 mg at 01/07/17 1749  •  cyclobenzaprine (FLEXERIL) tablet 10 mg, 10 mg, Oral, TID PRN, Fausto Horvath MD, 10 mg at 01/07/17 1748  •  diazePAM (VALIUM) tablet 5 mg, 5 mg, Oral, Q6H PRN, Fausto Horvath MD, 5 mg at 01/08/17 0240  •  diphenhydrAMINE (BENADRYL) capsule 25 mg, 25 mg, Oral, Q6H PRN, Fausto Horvath MD, 25 mg at 01/07/17 1748  •  HYDROcodone-acetaminophen (NORCO) 7.5-325 MG per tablet 1 tablet, 1 tablet, Oral, Q4H PRN, Latasha Faulkner MD, 1 tablet at 01/07/17 1637  •  HYDROcodone-acetaminophen (NORCO) 7.5-325 MG per tablet 2 tablet, 2 tablet, Oral, Q4H PRN, Latasha Faulkner MD  •  HYDROmorphone (DILAUDID) injection 0.5 mg, 0.5 mg, Intravenous, Q2H PRN, Kenny Sky MD, 0.5 mg at 01/08/17 0722  •  melatonin tablet 5 mg,  5 mg, Oral, Nightly, Latasha Faulkner MD, 5 mg at 01/07/17 2240  •  multivitamin with minerals 1 tablet, 1 tablet, Oral, Daily, Latasha Faulkner MD, 1 tablet at 01/07/17 1749  •  [DISCONTINUED] morphine injection 1 mg, 1 mg, Intravenous, Q4H PRN **AND** naloxone (NARCAN) injection 0.4 mg, 0.4 mg, Intravenous, Q5 Min PRN, Latasha Faulkner MD  •  [DISCONTINUED] morphine injection 1 mg, 1 mg, Intravenous, Q4H PRN, 1 mg at 01/07/17 1637 **AND** naloxone (NARCAN) injection 0.4 mg, 0.4 mg, Intravenous, Q5 Min PRN, Latasha Faulkner MD  •  ondansetron (ZOFRAN) injection 4 mg, 4 mg, Intravenous, Q6H PRN, Latasha Faulkner MD  •  sodium chloride 0.9 % flush 1-10 mL, 1-10 mL, Intravenous, PRN, Latasha Faulkner MD  •  sodium chloride 0.9 % infusion, 75 mL/hr, Intravenous, Continuous, Latasha Faulkner MD, Last Rate: 75 mL/hr at 01/08/17 0721, 75 mL/hr at 01/08/17 0721    Current Medications:  Scheduled Meds:  b complex-C-folic acid 1 capsule Oral Daily   melatonin 5 mg Oral Nightly   multivitamin with minerals 1 tablet Oral Daily     Continuous Infusions:  sodium chloride 75 mL/hr Last Rate: 75 mL/hr (01/08/17 0721)     PRN Meds:.•  acetaminophen  •  aspirin-acetaminophen-caffeine  •  cyclobenzaprine  •  diazePAM  •  diphenhydrAMINE  •  HYDROcodone-acetaminophen  •  HYDROcodone-acetaminophen  •  HYDROmorphone  •  [DISCONTINUED] Morphine **AND** naloxone  •  [DISCONTINUED] Morphine **AND** naloxone  •  ondansetron  •  sodium chloride    History reviewed. No pertinent past medical history.    Past Surgical History   Procedure Laterality Date   • Colon resection left  06/2013     due to several ruptured diverticuli        Social History     Occupational History   • Not on file.     Social History Main Topics   • Smoking status: Current Every Day Smoker     Packs/day: 0.25     Types: Cigarettes   • Smokeless tobacco: Not on file   • Alcohol use 1.2 - 1.8 oz/week     2 - 3 Cans of beer per week      Comment: per day    • Drug use: No   •  Sexual activity: Defer    Social History     Social History Narrative       History reviewed. No pertinent family history.    Review of Systems:     Constitutional:  Denies fever, shaking or chills   Eyes:  Denies change in visual acuity   HEENT:  Denies nasal congestion or sore throat   Respiratory:  Denies cough or shortness of breath   Cardiovascular:  Denies chest pain or edema  Endocrine: Denies tremors, palpitations, intolerance of heat or cold, polyuria, polydipsia.  GI:  Denies abdominal pain, nausea, vomiting, bloody stools or diarrhea  :  Denies frequency, urgency, incontinence, retention, or nocturia.  Musculoskeletal:  Denies numbness tingling or loss of motor function except as above  Integument:  Denies rash, lesion or ulceration   Neurologic:  Denies headache or focal weakness, deficits  Heme:  Denies epistaxis, spontaneous or excessive bleeding, epistaxis, hematuria, melena, fatigue, enlarged or tender lymph nodes.      All other pertinent positives and negatives as noted above in HPI.    Physical Exam: 61 y.o. female    General:  Awake, alert. No acute distress.      Head/Neck:  Normocephalic, atraumatic.  Conjunctiva and sclera clear.  Hearing adequate for the exam.  Neck is supple with normal ROM.    Psych:  Affect and demeanor appropriate.    CV:  Regular rate and rhythm.  Hemodynamically stable.    Lungs:  Good chest expansion, breathing unlabored.    Abdomen:  Soft.  Non-tender, non-distended.    Extremities:      Left lower extremity: Skin appears benign without obvious lacerations, ulcerations or lesions. Leg appears shortened and externally rotated. Compartments soft without evidence for DVT or compartment syndrome. No atrophy. No palpable masses or adenopathy. Focal TTP over hip. ROM of hip extremely limited and uncomfortable. No pain with passive motion of knee, ankle. Strength well-preserved distally. Sensation to light touch grossly intact distally. Good skin turgor, brisk cap refill  and good pulses distally.    All other extremities atraumatic without gross abnormality. Contralateral leg demonstrates no bony tenderness. Good hip, knee motion. No instability. Good motor and sensory function distally with brisk cap refill.    Diagnostic Tests:    Admission on 01/07/2017   Component Date Value Ref Range Status   • Glucose 01/07/2017 111* 65 - 99 mg/dL Final   • BUN 01/07/2017 14  6 - 20 mg/dL Final   • Creatinine 01/07/2017 0.55* 0.57 - 1.00 mg/dL Final   • Sodium 01/07/2017 140  136 - 145 mmol/L Final   • Potassium 01/07/2017 4.0  3.5 - 5.2 mmol/L Final   • Chloride 01/07/2017 101  98 - 107 mmol/L Final   • CO2 01/07/2017 23.0  22.0 - 29.0 mmol/L Final   • Calcium 01/07/2017 9.3  8.6 - 10.5 mg/dL Final   • Total Protein 01/07/2017 7.1  6.0 - 8.5 g/dL Final   • Albumin 01/07/2017 4.40  3.50 - 5.20 g/dL Final   • ALT (SGPT) 01/07/2017 23  1 - 33 U/L Final   • AST (SGOT) 01/07/2017 23  1 - 32 U/L Final   • Alkaline Phosphatase 01/07/2017 118* 39 - 117 U/L Final   • Total Bilirubin 01/07/2017 0.5  0.1 - 1.2 mg/dL Final   • eGFR Non African Amer 01/07/2017 113  >60 mL/min/1.73 Final   • Globulin 01/07/2017 2.7  gm/dL Final   • A/G Ratio 01/07/2017 1.6  g/dL Final   • BUN/Creatinine Ratio 01/07/2017 25.5* 7.0 - 25.0 Final   • Anion Gap 01/07/2017 16.0  mmol/L Final   • Protime 01/07/2017 12.7  11.7 - 14.2 Seconds Final   • INR 01/07/2017 0.99  0.90 - 1.10 Final   • ABO Type 01/07/2017 O   Final   • RH type 01/07/2017 Negative   Final   • Antibody Screen 01/07/2017 Negative   Final   • WBC 01/07/2017 12.84* 4.50 - 10.70 10*3/mm3 Final   • RBC 01/07/2017 4.09  3.90 - 5.20 10*6/mm3 Final   • Hemoglobin 01/07/2017 13.5  11.9 - 15.5 g/dL Final   • Hematocrit 01/07/2017 40.4  35.6 - 45.5 % Final   • MCV 01/07/2017 98.8* 80.5 - 98.2 fL Final   • MCH 01/07/2017 33.0* 26.9 - 32.0 pg Final   • MCHC 01/07/2017 33.4  32.4 - 36.3 g/dL Final   • RDW 01/07/2017 12.8  11.7 - 13.0 % Final   • RDW-SD 01/07/2017 46.3   37.0 - 54.0 fl Final   • MPV 01/07/2017 10.2  6.0 - 12.0 fL Final   • Platelets 01/07/2017 265  140 - 500 10*3/mm3 Final   • Neutrophil % 01/07/2017 81.4* 42.7 - 76.0 % Final   • Lymphocyte % 01/07/2017 10.4* 19.6 - 45.3 % Final   • Monocyte % 01/07/2017 6.7  5.0 - 12.0 % Final   • Eosinophil % 01/07/2017 1.0  0.3 - 6.2 % Final   • Basophil % 01/07/2017 0.3  0.0 - 1.5 % Final   • Immature Grans % 01/07/2017 0.2  0.0 - 0.5 % Final   • Neutrophils, Absolute 01/07/2017 10.46* 1.90 - 8.10 10*3/mm3 Final   • Lymphocytes, Absolute 01/07/2017 1.33  0.90 - 4.80 10*3/mm3 Final   • Monocytes, Absolute 01/07/2017 0.86  0.20 - 1.20 10*3/mm3 Final   • Eosinophils, Absolute 01/07/2017 0.13  0.00 - 0.70 10*3/mm3 Final   • Basophils, Absolute 01/07/2017 0.04  0.00 - 0.20 10*3/mm3 Final   • Immature Grans, Absolute 01/07/2017 0.02  0.00 - 0.03 10*3/mm3 Final   • TSH 01/07/2017 3.690  0.270 - 4.200 mIU/mL Final   • WBC 01/08/2017 13.59* 4.50 - 10.70 10*3/mm3 Final   • RBC 01/08/2017 3.98  3.90 - 5.20 10*6/mm3 Final   • Hemoglobin 01/08/2017 13.1  11.9 - 15.5 g/dL Final   • Hematocrit 01/08/2017 40.0  35.6 - 45.5 % Final   • MCV 01/08/2017 100.5* 80.5 - 98.2 fL Final   • MCH 01/08/2017 32.9* 26.9 - 32.0 pg Final   • MCHC 01/08/2017 32.8  32.4 - 36.3 g/dL Final   • RDW 01/08/2017 13.0  11.7 - 13.0 % Final   • RDW-SD 01/08/2017 47.9  37.0 - 54.0 fl Final   • MPV 01/08/2017 9.9  6.0 - 12.0 fL Final   • Platelets 01/08/2017 210  140 - 500 10*3/mm3 Final   • Glucose 01/08/2017 113* 65 - 99 mg/dL Final   • BUN 01/08/2017 9  6 - 20 mg/dL Final   • Creatinine 01/08/2017 0.53* 0.57 - 1.00 mg/dL Final   • Sodium 01/08/2017 135* 136 - 145 mmol/L Final   • Potassium 01/08/2017 3.7  3.5 - 5.2 mmol/L Final   • Chloride 01/08/2017 98  98 - 107 mmol/L Final   • CO2 01/08/2017 22.7  22.0 - 29.0 mmol/L Final   • Calcium 01/08/2017 9.3  8.6 - 10.5 mg/dL Final   • eGFR Non African Amer 01/08/2017 118  >60 mL/min/1.73 Final   • BUN/Creatinine Ratio  01/08/2017 17.0  7.0 - 25.0 Final   • Anion Gap 01/08/2017 14.3  mmol/L Final     Lab Results (last 24 hours)     Procedure Component Value Units Date/Time    CBC & Differential [[id number]] Collected:  01/07/17 1313    Specimen:  Blood Updated:  01/07/17 1331    Narrative:       The following orders were created for panel order CBC & Differential.  Procedure                               Abnormality         Status                     ---------                               -----------         ------                     CBC Auto Differential[[id number]]         Abnormal            Final result                 Please view results for these tests on the individual orders.    CBC Auto Differential [[id number]]  (Abnormal) Collected:  01/07/17 1313    Specimen:  Blood Updated:  01/07/17 1331     WBC 12.84 (H) 10*3/mm3      RBC 4.09 10*6/mm3      Hemoglobin 13.5 g/dL      Hematocrit 40.4 %      MCV 98.8 (H) fL      MCH 33.0 (H) pg      MCHC 33.4 g/dL      RDW 12.8 %      RDW-SD 46.3 fl      MPV 10.2 fL      Platelets 265 10*3/mm3      Neutrophil % 81.4 (H) %      Lymphocyte % 10.4 (L) %      Monocyte % 6.7 %      Eosinophil % 1.0 %      Basophil % 0.3 %      Immature Grans % 0.2 %      Neutrophils, Absolute 10.46 (H) 10*3/mm3      Lymphocytes, Absolute 1.33 10*3/mm3      Monocytes, Absolute 0.86 10*3/mm3      Eosinophils, Absolute 0.13 10*3/mm3      Basophils, Absolute 0.04 10*3/mm3      Immature Grans, Absolute 0.02 10*3/mm3     Protime-INR [[id number]]  (Normal) Collected:  01/07/17 1313    Specimen:  Blood Updated:  01/07/17 1339     Protime 12.7 Seconds      INR 0.99     Comprehensive Metabolic Panel [[id number]]  (Abnormal) Collected:  01/07/17 1313    Specimen:  Blood Updated:  01/07/17 1354     Glucose 111 (H) mg/dL      BUN 14 mg/dL      Creatinine 0.55 (L) mg/dL      Sodium 140 mmol/L      Potassium 4.0 mmol/L      Chloride 101 mmol/L      CO2 23.0 mmol/L      Calcium 9.3 mg/dL      Total Protein 7.1 g/dL      Albumin  4.40 g/dL      ALT (SGPT) 23 U/L      AST (SGOT) 23 U/L      Alkaline Phosphatase 118 (H) U/L      Total Bilirubin 0.5 mg/dL      eGFR Non African Amer 113 mL/min/1.73      Globulin 2.7 gm/dL      A/G Ratio 1.6 g/dL      BUN/Creatinine Ratio 25.5 (H)      Anion Gap 16.0 mmol/L     TSH [[id number]]  (Normal) Collected:  01/07/17 1313    Specimen:  Blood Updated:  01/07/17 1513     TSH 3.690 mIU/mL     CBC (No Diff) [[id number]]  (Abnormal) Collected:  01/08/17 0423    Specimen:  Blood Updated:  01/08/17 0447     WBC 13.59 (H) 10*3/mm3      RBC 3.98 10*6/mm3      Hemoglobin 13.1 g/dL      Hematocrit 40.0 %      .5 (H) fL      MCH 32.9 (H) pg      MCHC 32.8 g/dL      RDW 13.0 %      RDW-SD 47.9 fl      MPV 9.9 fL      Platelets 210 10*3/mm3     Basic Metabolic Panel [[id number]]  (Abnormal) Collected:  01/08/17 0423    Specimen:  Blood Updated:  01/08/17 0505     Glucose 113 (H) mg/dL      BUN 9 mg/dL      Creatinine 0.53 (L) mg/dL      Sodium 135 (L) mmol/L      Potassium 3.7 mmol/L      Chloride 98 mmol/L      CO2 22.7 mmol/L      Calcium 9.3 mg/dL      eGFR Non African Amer 118 mL/min/1.73      BUN/Creatinine Ratio 17.0      Anion Gap 14.3 mmol/L     Narrative:       GFR Normal >60  Chronic Kidney Disease <60  Kidney Failure <15          Imaging:  AP pelvis and lateral views of the left hip are available for review on the [facility] system along with the associated report. The films show a displaced subcapital femoral neck fracture.      Assessment:  Left femoral neck fracture    Plan:  We had a thorough discussion regarding the risks, benefits and alternatives to an arthroplasty and non-surgical management versus surgery.  I explained the pros and cons of a hemiarthroplasty versus a JAVON.  I had a lengthy conversation with the patient and her son.  She would prefer a JAVON rather than a alejandro.  I will transfer her care to my partner, Dr. Marcos, for surgical management.  Thanks for the consultation.  Please call with any  questions or concerns.    Date: 1/8/2017    Fausto Horvath MD    CC: Latasha Faulkner MD    Agree with above  No h/o preceeding pain prior to fall on ice with direct blow to hip on concrete.  Given young age will plan on JAVON.  I discussed the R/B/A of JAVON with pt and son:  Continuation of conservative management vs. JAVON discussed.  The patient wishes to proceed with total hip replacement.     Risk and benefits of surgery were reviewed.  Including, but not limited to, blood clots, anesthesia risk, infection, leg length discrepancy, fracture, skin/leg numbness, failure of the implant, need for future surgeries, continued pain, hematoma, need for transfusion, and death, among others.  The patient understands and wishes to proceed.     The spectrum of treatment options were discussed with the patient in detail including both the nonoperative and operative treatment modalities and their respective risks and benefits.  After thorough discussion, the patient has elected to undergo surgical treatment.  The details of the surgical procedure were explained including the location of probable incisions and a description of the likely implants to be used.  Models and diagrams were used as educational resources. The patient understands the likely convalescence after surgery, as well as the rehabilitation required.  We thoroughly discussed the risks, benefits, and alternatives to surgery.  The risks include but are not limited to the risk of infection, joint stiffness, blood clots (including DVT and/or pulmonary embolus along with the risk of death), neurologic and/or vascular injury, fracture, dislocation, nonunion, malunion, need for further surgery including hardware failure requiring revision, and continued pain. Following the completion of the discussion, the patient expressed understanding of this planned course of care, all their questions were answered and consent will be obtained preoperatively.    Operative Plan:  Posterior approach Total Hip Replacement a 1-2 day stay postoperatively with home health rehab          Electronically signed by Trever Marcos MD at 1/8/2017  8:07 PM      Fausto Horvath MD at 1/8/2017  8:21 AM  Version 1 of 2            Orthopedic Consult      Patient: Chantell Knight    Date of Admission: 1/7/2017 11:19 AM    YOB: 1955    Medical Record Number: [id number]    Consulting Physician: Latasha Faulkner MD    Chief Complaints: Left femoral neck fracture    History of Present Illness: 61 y.o. female admitted to [facility] to services of Latasha Faulkner MD with a displaced femoral neck fracture. Patient reports that injury was sustained in a fall. Landed awkwardly on the affected hip. Noticed immediate pain and inability to bear weight on the leg. Was brought to the ER and diagnosed with a displaced femoral neck fracture. Localizes the pain to the groin. Describes pain as severe, constant, worse with any movement but alleviated by rest. Denies LOC. Denies any other associated injury or complaint    Allergies:   Allergies   Allergen Reactions   • Penicillins Diarrhea       Medications:   Home Medications:    Current Facility-Administered Medications:   •  acetaminophen (TYLENOL) tablet 650 mg, 650 mg, Oral, Q4H PRN, Latasha Faulkner MD  •  aspirin-acetaminophen-caffeine (EXCEDRIN MIGRAINE) per tablet 1 tablet, 1 tablet, Oral, Q6H PRN, Latasha Faulkner MD  •  b complex-C-folic acid capsule 1 mg, 1 capsule, Oral, Daily, Ltaasha Faulkner MD, 1 mg at 01/07/17 1749  •  cyclobenzaprine (FLEXERIL) tablet 10 mg, 10 mg, Oral, TID PRN, Fausto Horvath MD, 10 mg at 01/07/17 1748  •  diazePAM (VALIUM) tablet 5 mg, 5 mg, Oral, Q6H PRN, Fausto Horvath MD, 5 mg at 01/08/17 0240  •  diphenhydrAMINE (BENADRYL) capsule 25 mg, 25 mg, Oral, Q6H PRN, Fausto Horvath MD, 25 mg at 01/07/17 1748  •  HYDROcodone-acetaminophen (NORCO) 7.5-325 MG per tablet 1 tablet, 1 tablet, Oral, Q4H PRN, Latasha Faulkner  MD, 1 tablet at 01/07/17 1637  •  HYDROcodone-acetaminophen (NORCO) 7.5-325 MG per tablet 2 tablet, 2 tablet, Oral, Q4H PRN, Latasha Faulkner MD  •  HYDROmorphone (DILAUDID) injection 0.5 mg, 0.5 mg, Intravenous, Q2H PRN, Kenny Sky MD, 0.5 mg at 01/08/17 0722  •  melatonin tablet 5 mg, 5 mg, Oral, Nightly, Latasha Faulkner MD, 5 mg at 01/07/17 2240  •  multivitamin with minerals 1 tablet, 1 tablet, Oral, Daily, Latasha Faulkner MD, 1 tablet at 01/07/17 1749  •  [DISCONTINUED] morphine injection 1 mg, 1 mg, Intravenous, Q4H PRN **AND** naloxone (NARCAN) injection 0.4 mg, 0.4 mg, Intravenous, Q5 Min PRN, Latasha Faulkner MD  •  [DISCONTINUED] morphine injection 1 mg, 1 mg, Intravenous, Q4H PRN, 1 mg at 01/07/17 1637 **AND** naloxone (NARCAN) injection 0.4 mg, 0.4 mg, Intravenous, Q5 Min PRN, Latasha Faulkner MD  •  ondansetron (ZOFRAN) injection 4 mg, 4 mg, Intravenous, Q6H PRN, Latasha Faulkner MD  •  sodium chloride 0.9 % flush 1-10 mL, 1-10 mL, Intravenous, PRN, Latasha Faulkner MD  •  sodium chloride 0.9 % infusion, 75 mL/hr, Intravenous, Continuous, Latasha Faulkner MD, Last Rate: 75 mL/hr at 01/08/17 0721, 75 mL/hr at 01/08/17 0721    Current Medications:  Scheduled Meds:  b complex-C-folic acid 1 capsule Oral Daily   melatonin 5 mg Oral Nightly   multivitamin with minerals 1 tablet Oral Daily     Continuous Infusions:  sodium chloride 75 mL/hr Last Rate: 75 mL/hr (01/08/17 0721)     PRN Meds:.•  acetaminophen  •  aspirin-acetaminophen-caffeine  •  cyclobenzaprine  •  diazePAM  •  diphenhydrAMINE  •  HYDROcodone-acetaminophen  •  HYDROcodone-acetaminophen  •  HYDROmorphone  •  [DISCONTINUED] Morphine **AND** naloxone  •  [DISCONTINUED] Morphine **AND** naloxone  •  ondansetron  •  sodium chloride    History reviewed. No pertinent past medical history.    Past Surgical History   Procedure Laterality Date   • Colon resection left  06/2013     due to several ruptured diverticuli        Social History      Occupational History   • Not on file.     Social History Main Topics   • Smoking status: Current Every Day Smoker     Packs/day: 0.25     Types: Cigarettes   • Smokeless tobacco: Not on file   • Alcohol use 1.2 - 1.8 oz/week     2 - 3 Cans of beer per week      Comment: per day    • Drug use: No   • Sexual activity: Defer    Social History     Social History Narrative       History reviewed. No pertinent family history.    Review of Systems:     Constitutional:  Denies fever, shaking or chills   Eyes:  Denies change in visual acuity   HEENT:  Denies nasal congestion or sore throat   Respiratory:  Denies cough or shortness of breath   Cardiovascular:  Denies chest pain or edema  Endocrine: Denies tremors, palpitations, intolerance of heat or cold, polyuria, polydipsia.  GI:  Denies abdominal pain, nausea, vomiting, bloody stools or diarrhea  :  Denies frequency, urgency, incontinence, retention, or nocturia.  Musculoskeletal:  Denies numbness tingling or loss of motor function except as above  Integument:  Denies rash, lesion or ulceration   Neurologic:  Denies headache or focal weakness, deficits  Heme:  Denies epistaxis, spontaneous or excessive bleeding, epistaxis, hematuria, melena, fatigue, enlarged or tender lymph nodes.      All other pertinent positives and negatives as noted above in HPI.    Physical Exam: 61 y.o. female    General:  Awake, alert. No acute distress.      Head/Neck:  Normocephalic, atraumatic.  Conjunctiva and sclera clear.  Hearing adequate for the exam.  Neck is supple with normal ROM.    Psych:  Affect and demeanor appropriate.    CV:  Regular rate and rhythm.  Hemodynamically stable.    Lungs:  Good chest expansion, breathing unlabored.    Abdomen:  Soft.  Non-tender, non-distended.    Extremities:      Left lower extremity: Skin appears benign without obvious lacerations, ulcerations or lesions. Leg appears shortened and externally rotated. Compartments soft without evidence for  DVT or compartment syndrome. No atrophy. No palpable masses or adenopathy. Focal TTP over hip. ROM of hip extremely limited and uncomfortable. No pain with passive motion of knee, ankle. Strength well-preserved distally. Sensation to light touch grossly intact distally. Good skin turgor, brisk cap refill and good pulses distally.    All other extremities atraumatic without gross abnormality. Contralateral leg demonstrates no bony tenderness. Good hip, knee motion. No instability. Good motor and sensory function distally with brisk cap refill.    Diagnostic Tests:    Admission on 01/07/2017   Component Date Value Ref Range Status   • Glucose 01/07/2017 111* 65 - 99 mg/dL Final   • BUN 01/07/2017 14  6 - 20 mg/dL Final   • Creatinine 01/07/2017 0.55* 0.57 - 1.00 mg/dL Final   • Sodium 01/07/2017 140  136 - 145 mmol/L Final   • Potassium 01/07/2017 4.0  3.5 - 5.2 mmol/L Final   • Chloride 01/07/2017 101  98 - 107 mmol/L Final   • CO2 01/07/2017 23.0  22.0 - 29.0 mmol/L Final   • Calcium 01/07/2017 9.3  8.6 - 10.5 mg/dL Final   • Total Protein 01/07/2017 7.1  6.0 - 8.5 g/dL Final   • Albumin 01/07/2017 4.40  3.50 - 5.20 g/dL Final   • ALT (SGPT) 01/07/2017 23  1 - 33 U/L Final   • AST (SGOT) 01/07/2017 23  1 - 32 U/L Final   • Alkaline Phosphatase 01/07/2017 118* 39 - 117 U/L Final   • Total Bilirubin 01/07/2017 0.5  0.1 - 1.2 mg/dL Final   • eGFR Non African Amer 01/07/2017 113  >60 mL/min/1.73 Final   • Globulin 01/07/2017 2.7  gm/dL Final   • A/G Ratio 01/07/2017 1.6  g/dL Final   • BUN/Creatinine Ratio 01/07/2017 25.5* 7.0 - 25.0 Final   • Anion Gap 01/07/2017 16.0  mmol/L Final   • Protime 01/07/2017 12.7  11.7 - 14.2 Seconds Final   • INR 01/07/2017 0.99  0.90 - 1.10 Final   • ABO Type 01/07/2017 O   Final   • RH type 01/07/2017 Negative   Final   • Antibody Screen 01/07/2017 Negative   Final   • WBC 01/07/2017 12.84* 4.50 - 10.70 10*3/mm3 Final   • RBC 01/07/2017 4.09  3.90 - 5.20 10*6/mm3 Final   • Hemoglobin  01/07/2017 13.5  11.9 - 15.5 g/dL Final   • Hematocrit 01/07/2017 40.4  35.6 - 45.5 % Final   • MCV 01/07/2017 98.8* 80.5 - 98.2 fL Final   • MCH 01/07/2017 33.0* 26.9 - 32.0 pg Final   • MCHC 01/07/2017 33.4  32.4 - 36.3 g/dL Final   • RDW 01/07/2017 12.8  11.7 - 13.0 % Final   • RDW-SD 01/07/2017 46.3  37.0 - 54.0 fl Final   • MPV 01/07/2017 10.2  6.0 - 12.0 fL Final   • Platelets 01/07/2017 265  140 - 500 10*3/mm3 Final   • Neutrophil % 01/07/2017 81.4* 42.7 - 76.0 % Final   • Lymphocyte % 01/07/2017 10.4* 19.6 - 45.3 % Final   • Monocyte % 01/07/2017 6.7  5.0 - 12.0 % Final   • Eosinophil % 01/07/2017 1.0  0.3 - 6.2 % Final   • Basophil % 01/07/2017 0.3  0.0 - 1.5 % Final   • Immature Grans % 01/07/2017 0.2  0.0 - 0.5 % Final   • Neutrophils, Absolute 01/07/2017 10.46* 1.90 - 8.10 10*3/mm3 Final   • Lymphocytes, Absolute 01/07/2017 1.33  0.90 - 4.80 10*3/mm3 Final   • Monocytes, Absolute 01/07/2017 0.86  0.20 - 1.20 10*3/mm3 Final   • Eosinophils, Absolute 01/07/2017 0.13  0.00 - 0.70 10*3/mm3 Final   • Basophils, Absolute 01/07/2017 0.04  0.00 - 0.20 10*3/mm3 Final   • Immature Grans, Absolute 01/07/2017 0.02  0.00 - 0.03 10*3/mm3 Final   • TSH 01/07/2017 3.690  0.270 - 4.200 mIU/mL Final   • WBC 01/08/2017 13.59* 4.50 - 10.70 10*3/mm3 Final   • RBC 01/08/2017 3.98  3.90 - 5.20 10*6/mm3 Final   • Hemoglobin 01/08/2017 13.1  11.9 - 15.5 g/dL Final   • Hematocrit 01/08/2017 40.0  35.6 - 45.5 % Final   • MCV 01/08/2017 100.5* 80.5 - 98.2 fL Final   • MCH 01/08/2017 32.9* 26.9 - 32.0 pg Final   • MCHC 01/08/2017 32.8  32.4 - 36.3 g/dL Final   • RDW 01/08/2017 13.0  11.7 - 13.0 % Final   • RDW-SD 01/08/2017 47.9  37.0 - 54.0 fl Final   • MPV 01/08/2017 9.9  6.0 - 12.0 fL Final   • Platelets 01/08/2017 210  140 - 500 10*3/mm3 Final   • Glucose 01/08/2017 113* 65 - 99 mg/dL Final   • BUN 01/08/2017 9  6 - 20 mg/dL Final   • Creatinine 01/08/2017 0.53* 0.57 - 1.00 mg/dL Final   • Sodium 01/08/2017 135* 136 - 145 mmol/L  Final   • Potassium 01/08/2017 3.7  3.5 - 5.2 mmol/L Final   • Chloride 01/08/2017 98  98 - 107 mmol/L Final   • CO2 01/08/2017 22.7  22.0 - 29.0 mmol/L Final   • Calcium 01/08/2017 9.3  8.6 - 10.5 mg/dL Final   • eGFR Non African Amer 01/08/2017 118  >60 mL/min/1.73 Final   • BUN/Creatinine Ratio 01/08/2017 17.0  7.0 - 25.0 Final   • Anion Gap 01/08/2017 14.3  mmol/L Final     Lab Results (last 24 hours)     Procedure Component Value Units Date/Time    CBC & Differential [[id number]] Collected:  01/07/17 1313    Specimen:  Blood Updated:  01/07/17 1331    Narrative:       The following orders were created for panel order CBC & Differential.  Procedure                               Abnormality         Status                     ---------                               -----------         ------                     CBC Auto Differential[[id number]]         Abnormal            Final result                 Please view results for these tests on the individual orders.    CBC Auto Differential [[id number]]  (Abnormal) Collected:  01/07/17 1313    Specimen:  Blood Updated:  01/07/17 1331     WBC 12.84 (H) 10*3/mm3      RBC 4.09 10*6/mm3      Hemoglobin 13.5 g/dL      Hematocrit 40.4 %      MCV 98.8 (H) fL      MCH 33.0 (H) pg      MCHC 33.4 g/dL      RDW 12.8 %      RDW-SD 46.3 fl      MPV 10.2 fL      Platelets 265 10*3/mm3      Neutrophil % 81.4 (H) %      Lymphocyte % 10.4 (L) %      Monocyte % 6.7 %      Eosinophil % 1.0 %      Basophil % 0.3 %      Immature Grans % 0.2 %      Neutrophils, Absolute 10.46 (H) 10*3/mm3      Lymphocytes, Absolute 1.33 10*3/mm3      Monocytes, Absolute 0.86 10*3/mm3      Eosinophils, Absolute 0.13 10*3/mm3      Basophils, Absolute 0.04 10*3/mm3      Immature Grans, Absolute 0.02 10*3/mm3     Protime-INR [[id number]]  (Normal) Collected:  01/07/17 1313    Specimen:  Blood Updated:  01/07/17 1339     Protime 12.7 Seconds      INR 0.99     Comprehensive Metabolic Panel [[id number]]  (Abnormal)  Collected:  01/07/17 1313    Specimen:  Blood Updated:  01/07/17 1354     Glucose 111 (H) mg/dL      BUN 14 mg/dL      Creatinine 0.55 (L) mg/dL      Sodium 140 mmol/L      Potassium 4.0 mmol/L      Chloride 101 mmol/L      CO2 23.0 mmol/L      Calcium 9.3 mg/dL      Total Protein 7.1 g/dL      Albumin 4.40 g/dL      ALT (SGPT) 23 U/L      AST (SGOT) 23 U/L      Alkaline Phosphatase 118 (H) U/L      Total Bilirubin 0.5 mg/dL      eGFR Non African Amer 113 mL/min/1.73      Globulin 2.7 gm/dL      A/G Ratio 1.6 g/dL      BUN/Creatinine Ratio 25.5 (H)      Anion Gap 16.0 mmol/L     TSH [[id number]]  (Normal) Collected:  01/07/17 1313    Specimen:  Blood Updated:  01/07/17 1513     TSH 3.690 mIU/mL     CBC (No Diff) [[id number]]  (Abnormal) Collected:  01/08/17 0423    Specimen:  Blood Updated:  01/08/17 0447     WBC 13.59 (H) 10*3/mm3      RBC 3.98 10*6/mm3      Hemoglobin 13.1 g/dL      Hematocrit 40.0 %      .5 (H) fL      MCH 32.9 (H) pg      MCHC 32.8 g/dL      RDW 13.0 %      RDW-SD 47.9 fl      MPV 9.9 fL      Platelets 210 10*3/mm3     Basic Metabolic Panel [[id number]]  (Abnormal) Collected:  01/08/17 0423    Specimen:  Blood Updated:  01/08/17 0505     Glucose 113 (H) mg/dL      BUN 9 mg/dL      Creatinine 0.53 (L) mg/dL      Sodium 135 (L) mmol/L      Potassium 3.7 mmol/L      Chloride 98 mmol/L      CO2 22.7 mmol/L      Calcium 9.3 mg/dL      eGFR Non African Amer 118 mL/min/1.73      BUN/Creatinine Ratio 17.0      Anion Gap 14.3 mmol/L     Narrative:       GFR Normal >60  Chronic Kidney Disease <60  Kidney Failure <15          Imaging:  AP pelvis and lateral views of the left hip are available for review on the [facility] system along with the associated report. The films show a displaced subcapital femoral neck fracture.      Assessment:  Left femoral neck fracture    Plan:  We had a thorough discussion regarding the risks, benefits and alternatives to an arthroplasty and non-surgical management versus surgery.   I explained the pros and cons of a hemiarthroplasty versus a JAVON.  I had a lengthy conversation with the patient and her son.  She would prefer a JAVON rather than a alejandro.  I will transfer her care to my partner, Dr. Marcos, for surgical management.  Thanks for the consultation.  Please call with any questions or concerns.    Date: 1/8/2017    Fausto Horvath MD    CC: Latasha Faulkner MD         Electronically signed by Fausto Horvath MD at 1/8/2017  8:24 AM

## 2017-01-10 NOTE — PLAN OF CARE
Problem: Patient Care Overview (Adult)  Goal: Plan of Care Review  Outcome: Ongoing (interventions implemented as appropriate)    01/10/17 0513   Coping/Psychosocial Response Interventions   Plan Of Care Reviewed With patient   Patient Care Overview   Progress improving   Outcome Evaluation   Outcome Summary/Follow up Plan ambulates well with assist and wx. pain well controlled

## 2017-01-10 NOTE — PROGRESS NOTES
Acute Care - Physical Therapy Treatment Note  [facility]     Patient Name: Chantell Knight  : 1955  MRN: [id number]  Today's Date: 1/10/2017  Onset of Illness/Injury or Date of Surgery Date: 17     Referring Physician: Hemant    Admit Date: 2017    Visit Dx:    ICD-10-CM ICD-9-CM   1. Closed fracture of neck of left femur, initial encounter S72.002A 820.8   2. Femoral neck fracture S72.009A 820.8   3. Difficulty walking R26.2 719.7     Patient Active Problem List   Diagnosis   • Closed fracture of neck of left femur   • EKG abnormality   • Hyperglycemia   • Uncomplicated alcohol dependence   • Tobacco abuse   • Leukocytosis               Adult Rehabilitation Note       01/10/17 1455          Rehab Assessment/Intervention    Discipline physical therapy assistant  -LUCIANO      Document Type therapy note (daily note)  -LUCIANO      Subjective Information agree to therapy  -LUCIANO      Patient Effort, Rehab Treatment good  -LUCIANO      Precautions/Limitations fall precautions;hip precautions- left  -LUCIANO      Recorded by [LUCIANO] Louis Sutherland PTA      Pain Assessment    Pain Assessment 0-10  -LUCIANO      Pain Score 1  -LUCIANO      Post Pain Score 4  -LUCIANO      Pain Type Acute pain;Surgical pain  -LUCIANO      Pain Location Hip  -LUCIANO      Pain Orientation Left  -LUCIANO      Pain Intervention(s) Ambulation/increased activity;Repositioned;Rest  -LUCIANO      Response to Interventions tolerated  -LUCIANO      Recorded by [LUCIANO] Louis Sutherland PTA      Cognitive Assessment/Intervention    Current Cognitive/Communication Assessment functional  -LUCIANO      Orientation Status oriented x 4  -LUCIANO      Follows Commands/Answers Questions 100% of the time  -LUCIANO      Personal Safety WNL/WFL  -LUCIANO      Personal Safety Interventions fall prevention program maintained;gait belt;nonskid shoes/slippers when out of bed  -LUCIANO      Recorded by [LUCIANO] Louis Sutherland PTA      Bed Mobility, Assessment/Treatment    Bed Mobility, Assistive Device head of bed elevated  -LUCIANO      Bed Mob, Supine  to Sit, [place] conditional independence  -LUCIANO      Bed Mob, Sit to Supine, [place] conditional independence  -LUCIANO      Recorded by [LUCIANO] Louis Sutherland PTA      Transfer Assessment/Treatment    Transfers, Sit-Stand [place] supervision required  -LUCIANO      Transfers, Stand-Sit [place] supervision required  -LUCIANO      Transfers, Sit-Stand-Sit, Assist Device rolling walker  -LUCIANO      Toilet Transfer, [place] supervision required  -LUCIANO      Toilet Transfer, Assistive Device rolling walker  -LUCIANO      Transfer, Impairments strength decreased;pain  -LUCIANO      Recorded by [LUCIANO] Louis Sutherland PTA      Gait Assessment/Treatment    Gait, [place] Level supervision required  -LUCIANO      Gait, Assistive Device rolling walker  -LUCIANO      Gait, Distance (Feet) 400  -LUCIANO      Gait, Gait Deviations angela decreased;left:;antalgic;decreased heel strike;weight-shifting ability decreased  -LUCIANO      Gait, Impairments strength decreased;pain  -LUCIANO      Recorded by [LUCIANO] Louis Sutherland PTA      Therapy Exercises    Exercise Protocols total hip  -LUCIANO      Total Hip Exercises left:;15 reps;completed protocol  -LUCIANO      Recorded by [LUCIANO] Louis Sutherland PTA      Positioning and Restraints    Pre-Treatment Position in bed  -LUCIANO      Post Treatment Position chair  -LUCIANO      In Chair reclined;call light within reach;encouraged to call for assist;with family/caregiver  -LUCIANO      Recorded by [LUCIANO] Louis Sutherland PTA        User Key  (r) = Recorded By, (t) = Taken By, (c) = Cosigned By    Initials Name Effective Dates    LUCIANO Sutherland PTA 04/24/15 -                 IP PT Goals       01/10/17 0919          Bed Mobility PT LTG    Bed Mobility PT LTG, Date Established 01/10/17  -EE      Bed Mobility PT LTG, Time to Achieve 3 days  -EE      Bed Mobility PT LTG, Activity Type all bed mobility  -EE      Bed Mobility PT LTG, [place] Level conditional independence  -EE      Transfer Training PT LTG    Transfer Training PT LTG, Date Established 01/10/17   -EE      Transfer Training PT LTG, Time to Achieve 3 days  -EE      Transfer Training PT LTG, Activity Type all transfers  -EE      Transfer Training PT LTG, [place] Level supervision required  -EE      Transfer Training PT LTG, Assist Device walker, rolling  -EE      Gait Training PT LTG    Gait Training Goal PT LTG, Date Established 01/10/17  -EE      Gait Training Goal PT LTG, Time to Achieve 3 days  -EE      Gait Training Goal PT LTG, [place] Level supervision required  -EE      Gait Training Goal PT LTG, Assist Device walker, rolling  -EE      Gait Training Goal PT LTG, Distance to Achieve 150  -EE      Stair Training PT LTG    Stair Training Goal PT LTG, Date Established 01/10/17  -EE      Stair Training Goal PT LTG, Time to Achieve 3 days  -EE      Stair Training Goal PT LTG, Number of Steps 3  -EE      Stair Training Goal PT LTG, [place] Level contact guard assist  -EE      Stair Training Goal PT LTG, Assist Device 2 handrails  -EE      Patient Education PT LTG    Patient Education PT LTG, Date Established 01/10/17  -EE      Patient Education PT LTG, Time to Achieve 3 days  -EE      Patient Education PT LTG, Education Type HEP;precaution per surgeon  -EE      Patient Education PT LTG, Education Understanding demonstrate adequately;verbalize understanding  -EE        User Key  (r) = Recorded By, (t) = Taken By, (c) = Cosigned By    Initials Name Provider Type    AVANI Lang, PT Physical Therapist          Physical Therapy Education     Title: PT OT SLP Therapies (Done)     Topic: Physical Therapy (Done)     Point: Mobility training (Done)    Learning Progress Summary    Learner Readiness Method Response Comment Documented by Status   Patient Acceptance E VU  LUCIANO 01/10/17 1627 Done    Acceptance E,TB MAGDALENO,NR  EE 01/10/17 0919 Done               Point: Home exercise program (Done)    Learning Progress Summary    Learner Readiness Method Response Comment Documented by Status   Patient Acceptance E  VU  LUCIANO 01/10/17 1627 Done    Acceptance E,TB VU,NR  EE 01/10/17 0919 Done               Point: Body mechanics (Done)    Learning Progress Summary    Learner Readiness Method Response Comment Documented by Status   Patient Acceptance E VU  LUCIANO 01/10/17 1627 Done    Acceptance E,TB VU,NR  EE 01/10/17 0919 Done               Point: Precautions (Done)    Learning Progress Summary    Learner Readiness Method Response Comment Documented by Status   Patient Acceptance E VU  LUCIANO 01/10/17 1627 Done    Acceptance E,TB VU,NR  EE 01/10/17 0919 Done                      User Key     Initials Effective Dates Name Provider Type Discipline     12/01/15 -  Stacy Lang, PT Physical Therapist PT    LUCIANO 04/24/15 -  Louis Sutherland, PTA Physical Therapy Assistant PT                    PT Recommendation and Plan  Anticipated Discharge Disposition: home with home health  Planned Therapy Interventions: balance training, bed mobility training, gait training, home exercise program, patient/family education, stair training, strengthening, stretching, transfer training  PT Frequency: 2 times/day  Plan of Care Review  Plan Of Care Reviewed With: patient  Progress: improving  Outcome Summary/Follow up Plan: Pt with improved functional independence and ambulation endurance this PM.  Pt educated on hip precautions as well as improved safety awareness upon returning home.  No new concerns.           Outcome Measures       01/10/17 1600 01/10/17 0900       How much help from another person do you currently need...    Turning from your back to your side while in flat bed without using bedrails? 4  -LUCIANO 4  -EE     Moving from lying on back to sitting on the side of a flat bed without bedrails? 4  -LUCIANO 3  -EE     Moving to and from a bed to a chair (including a wheelchair)? 3  -LUCIANO 3  -EE     Standing up from a chair using your arms (e.g., wheelchair, bedside chair)? 3  -LUCIANO 3  -EE     Climbing 3-5 steps with a railing? 3  -LUCIANO 3  -EE     To walk in hospital  room? 3  -LUCIANO 3  -EE     AM-PAC 6 Clicks Score 20  -LUCIANO 19  -EE     Functional Assessment    Outcome Measure Options AM-PAC 6 Clicks Basic Mobility (PT)  -LUCIANO AM-PAC 6 Clicks Basic Mobility (PT)  -EE       User Key  (r) = Recorded By, (t) = Taken By, (c) = Cosigned By    Initials Name Provider Type    AVANI Lang, PT Physical Therapist    LUCIANO Sutherland, DOROTHY Physical Therapy Assistant           Time Calculation:         PT Charges       01/10/17 1627 01/10/17 0921       Time Calculation    Start Time 1455  -LUCIANO 0901  -EE     Stop Time 1515  -LUCIANO 0921  -EE     Time Calculation (min) 20 min  -LUCIANO 20 min  -EE     PT Received On 01/10/17  -LUCIANO 01/10/17  -EE     PT - Next Appointment 01/11/17  -LUCIANO 01/10/17  -EE     PT Goal Re-Cert Due Date  01/13/17  -EE       User Key  (r) = Recorded By, (t) = Taken By, (c) = Cosigned By    Initials Name Provider Type    AVANI Lang, PT Physical Therapist    LUCIANO Sutherland PTA Physical Therapy Assistant          Therapy Charges for Today     Code Description Service Date Service Provider Modifiers Qty    [id number] HC PT THER PROC EA 15 MIN 1/10/2017 Louis Sutherland PTA GP 1          PT G-Codes  Outcome Measure Options: AM-PAC 6 Clicks Basic Mobility (PT)    Louis Sutherland PTA  1/10/2017

## 2017-01-10 NOTE — PLAN OF CARE
Problem: Patient Care Overview (Adult)  Goal: Plan of Care Review    01/10/17 0919   Coping/Psychosocial Response Interventions   Plan Of Care Reviewed With patient   Outcome Evaluation   Outcome Summary/Follow up Plan Pt s/p L JAVON to repair L hip fx, demonstrating post op pain and weakness limiting mobility. Pt would benefit from skilled PT to address impairments and assist w/return to PLOF. Planning to DC home tommorrow w/family to assist and follow w/HH PT; no problems anticipated.          Problem: Inpatient Physical Therapy  Goal: Bed Mobility Goal LTG- PT    01/10/17 0919   Bed Mobility PT LTG   Bed Mobility PT LTG, Date Established 01/10/17   Bed Mobility PT LTG, Time to Achieve 3 days   Bed Mobility PT LTG, Activity Type all bed mobility   Bed Mobility PT LTG, [place] Level conditional independence       Goal: Transfer Training Goal 1 LTG- PT    01/10/17 0919   Transfer Training PT LTG   Transfer Training PT LTG, Date Established 01/10/17   Transfer Training PT LTG, Time to Achieve 3 days   Transfer Training PT LTG, Activity Type all transfers   Transfer Training PT LTG, [place] Level supervision required   Transfer Training PT LTG, Assist Device walker, rolling       Goal: Gait Training Goal LTG- PT    01/10/17 0919   Gait Training PT LTG   Gait Training Goal PT LTG, Date Established 01/10/17   Gait Training Goal PT LTG, Time to Achieve 3 days   Gait Training Goal PT LTG, [place] Level supervision required   Gait Training Goal PT LTG, Assist Device walker, rolling   Gait Training Goal PT LTG, Distance to Achieve 150       Goal: Stair Training Goal LTG- PT    01/10/17 0919   Stair Training PT LTG   Stair Training Goal PT LTG, Date Established 01/10/17   Stair Training Goal PT LTG, Time to Achieve 3 days   Stair Training Goal PT LTG, Number of Steps 3   Stair Training Goal PT LTG, [place] Level contact guard assist   Stair Training Goal PT LTG, Assist Device 2 handrails       Goal: Patient  Education Goal LTG- PT    01/10/17 0919   Patient Education PT LTG   Patient Education PT LTG, Date Established 01/10/17   Patient Education PT LTG, Time to Achieve 3 days   Patient Education PT LTG, Education Type HEP;precaution per surgeon   Patient Education PT LTG, Education Understanding demonstrate adequately;verbalize understanding

## 2017-01-10 NOTE — PLAN OF CARE
Problem: Patient Care Overview (Adult)  Goal: Plan of Care Review  Outcome: Ongoing (interventions implemented as appropriate)    01/10/17 8438   Coping/Psychosocial Response Interventions   Plan Of Care Reviewed With patient   Outcome Evaluation   Outcome Summary/Follow up Plan Pt with improved functional independence and ambulation endurance this PM. Pt educated on hip precautions as well as improved safety awareness upon returning home. No new concerns.

## 2017-01-11 VITALS
OXYGEN SATURATION: 92 % | TEMPERATURE: 98.5 F | BODY MASS INDEX: 20.89 KG/M2 | DIASTOLIC BLOOD PRESSURE: 66 MMHG | RESPIRATION RATE: 16 BRPM | WEIGHT: 130 LBS | HEIGHT: 66 IN | SYSTOLIC BLOOD PRESSURE: 102 MMHG | HEART RATE: 89 BPM

## 2017-01-11 LAB
CYTO UR: NORMAL
HCT VFR BLD AUTO: 30.4 % (ref 35.6–45.5)
HGB BLD-MCNC: 10.1 G/DL (ref 11.9–15.5)
LAB AP CASE REPORT: NORMAL
Lab: NORMAL
PATH REPORT.FINAL DX SPEC: NORMAL
PATH REPORT.GROSS SPEC: NORMAL

## 2017-01-11 PROCEDURE — 85018 HEMOGLOBIN: CPT | Performed by: NURSE PRACTITIONER

## 2017-01-11 PROCEDURE — 25010000002 VANCOMYCIN PER 500 MG: Performed by: NURSE PRACTITIONER

## 2017-01-11 PROCEDURE — 97110 THERAPEUTIC EXERCISES: CPT

## 2017-01-11 PROCEDURE — 85014 HEMATOCRIT: CPT | Performed by: NURSE PRACTITIONER

## 2017-01-11 PROCEDURE — 97150 GROUP THERAPEUTIC PROCEDURES: CPT

## 2017-01-11 RX ORDER — MELOXICAM 15 MG/1
15 TABLET ORAL DAILY
Qty: 29 TABLET | Refills: 0 | Status: SHIPPED | OUTPATIENT
Start: 2017-01-11 | End: 2017-02-09

## 2017-01-11 RX ORDER — HYDROCODONE BITARTRATE AND ACETAMINOPHEN 7.5; 325 MG/1; MG/1
2 TABLET ORAL EVERY 4 HOURS PRN
Qty: 40 TABLET | Refills: 0 | Status: SHIPPED | OUTPATIENT
Start: 2017-01-11 | End: 2017-01-19

## 2017-01-11 RX ORDER — UREA 10 %
1 LOTION (ML) TOPICAL NIGHTLY PRN
Start: 2017-01-11 | End: 2017-01-16

## 2017-01-11 RX ORDER — ACETAMINOPHEN 325 MG/1
650 TABLET ORAL EVERY 6 HOURS PRN
Refills: 0
Start: 2017-01-11 | End: 2017-01-16

## 2017-01-11 RX ADMIN — ASPIRIN 325 MG: 325 TABLET, DELAYED RELEASE ORAL at 08:38

## 2017-01-11 RX ADMIN — HYDROCODONE BITARTRATE AND ACETAMINOPHEN 2 TABLET: 7.5; 325 TABLET ORAL at 11:17

## 2017-01-11 RX ADMIN — POLYETHYLENE GLYCOL 3350 17 G: 17 POWDER, FOR SOLUTION ORAL at 08:38

## 2017-01-11 RX ADMIN — MELOXICAM 15 MG: 15 TABLET ORAL at 08:38

## 2017-01-11 RX ADMIN — HYDROCODONE BITARTRATE AND ACETAMINOPHEN 2 TABLET: 7.5; 325 TABLET ORAL at 03:24

## 2017-01-11 RX ADMIN — HYDROCODONE BITARTRATE AND ACETAMINOPHEN 2 TABLET: 7.5; 325 TABLET ORAL at 07:34

## 2017-01-11 RX ADMIN — DOCUSATE SODIUM 100 MG: 100 CAPSULE, LIQUID FILLED ORAL at 08:38

## 2017-01-11 RX ADMIN — VANCOMYCIN HYDROCHLORIDE 1000 MG: 1 INJECTION, SOLUTION INTRAVENOUS at 03:24

## 2017-01-11 NOTE — PLAN OF CARE
Problem: Inpatient Physical Therapy  Goal: Bed Mobility Goal LTG- PT  Outcome: Unable to achieve outcome(s) by discharge Date Met:  01/11/17 01/11/17 1254   Bed Mobility PT LTG   Bed Mobility PT LTG, Outcome goal not met   Bed Mobility PT LTG, Reason Goal Not Met discharged from facility       Goal: Transfer Training Goal 1 LTG- PT  Outcome: Outcome(s) achieved Date Met:  01/11/17 01/11/17 1254   Transfer Training PT LTG   Transfer Training PT LTG, Outcome goal met       Goal: Gait Training Goal LTG- PT  Outcome: Outcome(s) achieved Date Met:  01/11/17 01/11/17 1254   Gait Training PT LTG   Gait Training Goal PT LTG, Outcome goal met  (yesterday 400ft, pn limiting today 120ft)       Goal: Stair Training Goal LTG- PT  Outcome: Outcome(s) achieved Date Met:  01/11/17 01/11/17 1254   Stair Training PT LTG   Stair Training Goal PT LTG, Outcome goal met  (but only has one rail)         01/11/17 1254   Stair Training PT LTG   Stair Training Goal PT LTG, Outcome goal met  (but only has one rail)       Goal: Patient Education Goal LTG- PT  Outcome: Outcome(s) achieved Date Met:  01/11/17 01/11/17 1254   Patient Education PT LTG   Patient Education PT LTG Outcome goal met         01/11/17 1254   Patient Education PT LTG   Patient Education PT LTG Outcome goal met

## 2017-01-11 NOTE — PROGRESS NOTES
" LOS: 4 days   Primary Care Physician: RODOLFO Doyle MD     Subjective  Doing well.  Has been up and about.  Sore but tolerable    Vital Signs  Body mass index is 20.98 kg/(m^2).  Temp:  [97.2 °F (36.2 °C)-98.9 °F (37.2 °C)] 98.5 °F (36.9 °C)  Heart Rate:  [72-89] 89  Resp:  [16] 16  BP: ()/(63-69) 102/66      Objective:  General Appearance:  In no acute distress.    Vital signs: (most recent): Blood pressure 102/66, pulse 89, temperature 98.5 °F (36.9 °C), temperature source Oral, resp. rate 16, height 66\" (167.6 cm), weight 130 lb (59 kg), SpO2 92 %.    Lungs:  Normal respiratory rate and normal effort.  Breath sounds clear to auscultation.    Heart: Normal rate.  Regular rhythm.  No murmur.   Abdomen: Abdomen is non-distended.  There is no abdominal tenderness.     Extremities: There is dependent edema.  (Trace edema left ankle greater than right)  Neurological: Patient is alert.          Results Review:    I reviewed the patient's new clinical results.      Results from last 7 days  Lab Units 01/11/17  0342 01/10/17  0357 01/08/17  0423 01/07/17  1313   WBC 10*3/mm3  --   --  13.59* 12.84*   HEMOGLOBIN g/dL 10.1* 11.2* 13.1 13.5   PLATELETS 10*3/mm3  --   --  210 265       Results from last 7 days  Lab Units 01/10/17  0357 01/08/17  0423   SODIUM mmol/L 138 135*   POTASSIUM mmol/L 3.8 3.7   CHLORIDE mmol/L 98 98   TOTAL CO2 mmol/L 26.5 22.7   BUN mg/dL 6 9   CREATININE mg/dL 0.46* 0.53*   CALCIUM mg/dL 9.0 9.3   GLUCOSE mg/dL 226* 113*       Results from last 7 days  Lab Units 01/07/17  1313   INR  0.99     Hemoglobin A1C:  Lab Results   Component Value Date    HGBA1C 4.97 01/09/2017       Glucose Range:No results found for: POCGLU    Medication Review: Yes    Physical Therapy:    Assessment/Plan     Active Hospital Problems (** Indicates Principal Problem)    Diagnosis Date Noted   • **Closed fracture of neck of left femur [S72.002A] 01/07/2017   • EKG abnormality [R94.31] 01/08/2017   • Hyperglycemia " [R73.9] 01/08/2017   • Uncomplicated alcohol dependence [F10.20] 01/08/2017   • Tobacco abuse [Z72.0] 01/08/2017   • Leukocytosis [D72.829] 01/08/2017      Resolved Hospital Problems    Diagnosis Date Noted Date Resolved   No resolved problems to display.       Assessment & Plan  1.  Left hip fracture status post replacement.  Doing well.  Okay for discharge home.  Expected acute blood loss anemia, asymptomatic.  On aspirin twice a day for DVT prophylaxis  2.  Abnormal EKG on admission.  Echocardiogram normal.  3.  Alcohol dependence, uncomplicated.  No withdrawal.  Discussed with patient  4.  Leukocytosis. clinically she looks fine.  Outpatient follow-up; likely reactive.  I ordered a CBC but it looks like only hemoglobin/hematocrit were done.  Outpatient follow-up  5.  Hyperglycemia without diabetes    Disposition: Home today with home health  [id number]    Latasha Faulkner MD  01/11/17  8:53 AM

## 2017-01-11 NOTE — THERAPY DISCHARGE NOTE
Acute Care - Physical Therapy Treatment Note/Discharge  [facility]     Patient Name: Chantell Knight  : 1955  MRN: [id number]  Today's Date: 2017  Onset of Illness/Injury or Date of Surgery Date: 17     Referring Physician: Hemant    Admit Date: 2017    Visit Dx:    ICD-10-CM ICD-9-CM   1. Closed fracture of neck of left femur, initial encounter S72.002A 820.8   2. Femoral neck fracture S72.009A 820.8   3. Difficulty walking R26.2 719.7     Patient Active Problem List   Diagnosis   • Closed fracture of neck of left femur   • EKG abnormality   • Hyperglycemia   • Uncomplicated alcohol dependence   • Tobacco abuse   • Leukocytosis       Physical Therapy Education     Title: PT OT SLP Therapies (Resolved)     Topic: Physical Therapy (Resolved)     Point: Mobility training (Resolved)    Learning Progress Summary    Learner Readiness Method Response Comment Documented by Status   Patient Eager E,TB,D,H ANAYELI RAPHAEL 17 1253 Done    Acceptance E VU  LUCIANO 01/10/17 1627 Done    Acceptance E,TB VU,NR  EE 01/10/17 0919 Done               Point: Home exercise program (Resolved)    Learning Progress Summary    Learner Readiness Method Response Comment Documented by Status   Patient Eager E,TB,D,H ANAYELI RAPHAEL 17 1253 Done    Acceptance E VU  LUCIANO 01/10/17 1627 Done    Acceptance E,TB VU,NR  EE 01/10/17 0919 Done               Point: Body mechanics (Resolved)    Learning Progress Summary    Learner Readiness Method Response Comment Documented by Status   Patient Eager E,TB,D,H ANAYELI RAPHAEL 17 1253 Done    Acceptance E VU  LUCIANO 01/10/17 1627 Done    Acceptance E,TB VU,NR  EE 01/10/17 0919 Done               Point: Precautions (Resolved)    Learning Progress Summary    Learner Readiness Method Response Comment Documented by Status   Patient Eager E,TB,D,H ANAYELI RAPHAEL 17 1253 Done    Acceptance E VU  LUCIANO 01/10/17 1627 Done    Acceptance E,TB VU,NR  EE 01/10/17 0919 Done                      User Key     Initials  Effective Dates Name Provider Type Discipline    EE 12/01/15 -  Stacy Lang, PT Physical Therapist PT    JM 02/18/16 -  Barb Encinas, PTA Physical Therapy Assistant PT    LUCIANO 04/24/15 -  Louis Sutherland, PTA Physical Therapy Assistant PT                    IP PT Goals       01/11/17 1254 01/10/17 0919       Bed Mobility PT LTG    Bed Mobility PT LTG, Date Established  01/10/17  -EE     Bed Mobility PT LTG, Time to Achieve  3 days  -EE     Bed Mobility PT LTG, Activity Type  all bed mobility  -EE     Bed Mobility PT LTG, [place] Level  conditional independence  -EE     Bed Mobility PT LTG, Outcome goal not met  -      Bed Mobility PT LTG, Reason Goal Not Met discharged from facility  -      Transfer Training PT LTG    Transfer Training PT LTG, Date Established  01/10/17  -EE     Transfer Training PT LTG, Time to Achieve  3 days  -EE     Transfer Training PT LTG, Activity Type  all transfers  -EE     Transfer Training PT LTG, [place] Level  supervision required  -EE     Transfer Training PT LTG, Assist Device  walker, rolling  -EE     Transfer Training PT LTG, Outcome goal met  -      Gait Training PT LTG    Gait Training Goal PT LTG, Date Established  01/10/17  -EE     Gait Training Goal PT LTG, Time to Achieve  3 days  -EE     Gait Training Goal PT LTG, [place] Level  supervision required  -EE     Gait Training Goal PT LTG, Assist Device  walker, rolling  -EE     Gait Training Goal PT LTG, Distance to Achieve  150  -EE     Gait Training Goal PT LTG, Outcome goal met   yesterday 400ft, pn limiting today 120ft  -JM      Stair Training PT LTG    Stair Training Goal PT LTG, Date Established  01/10/17  -EE     Stair Training Goal PT LTG, Time to Achieve  3 days  -EE     Stair Training Goal PT LTG, Number of Steps  3  -EE     Stair Training Goal PT LTG, [place] Level  contact guard assist  -EE     Stair Training Goal PT LTG, Assist Device  2 handrails  -EE     Stair Training Goal PT LTG, Outcome  goal met   but only has one rail  -      Patient Education PT LTG    Patient Education PT LTG, Date Established  01/10/17  -EE     Patient Education PT LTG, Time to Achieve  3 days  -EE     Patient Education PT LTG, Education Type  HEP;precaution per surgeon  -EE     Patient Education PT LTG, Education Understanding  demonstrate adequately;verbalize understanding  -EE     Patient Education PT LTG Outcome goal met  -        User Key  (r) = Recorded By, (t) = Taken By, (c) = Cosigned By    Initials Name Provider Type    EE Stacy Lang, PT Physical Therapist    IJEOMA Encinas PTA Physical Therapy Assistant              Adult Rehabilitation Note       01/11/17 1250 01/10/17 1455       Rehab Assessment/Intervention    Discipline physical therapy assistant  - physical therapy assistant  -LUCIANO     Document Type discharge summary;therapy note (daily note)  - therapy note (daily note)  -LUCIANO     Subjective Information agree to therapy;complains of;pain;fatigue  - agree to therapy  -LUCIANO     Patient Effort, Rehab Treatment  good  -LUCIANO     Precautions/Limitations fall precautions;hip precautions- left  - fall precautions;hip precautions- left  -LUCIANO     Recorded by [] Barb Encinas PTA [LUCIANO] Louis Sutherland PTA     Pain Assessment    Pain Assessment 0-10  - 0-10  -LUCIANO     Pain Score 4  -JM 1  -LUCIANO     Post Pain Score  4  -LUCIANO     Pain Type Surgical pain  - Acute pain;Surgical pain  -LUCIANO     Pain Location Hip  - Hip  -LUCIANO     Pain Orientation Left  -JM Left  -LUCIANO     Pain Intervention(s)  Ambulation/increased activity;Repositioned;Rest  -LUCIANO     Response to Interventions  tolerated  -LUCIANO     Recorded by [] Barb Encinas PTA [LUCIANO] Louis Sutherland PTA     Cognitive Assessment/Intervention    Current Cognitive/Communication Assessment  functional  -LUCIANO     Orientation Status  oriented x 4  -LUCIANO     Follows Commands/Answers Questions  100% of the time  -LUCIANO     Personal Safety  WNL/WFL  -LUCIANO     Personal Safety Interventions   fall prevention program maintained;gait belt;nonskid shoes/slippers when out of bed  -LUCIANO     Recorded by  [LUCIANO] Louis Sutherland PTA     Bed Mobility, Assessment/Treatment    Bed Mobility, Assistive Device  head of bed elevated  -LUCIANO     Bed Mob, Supine to Sit, [place] supervision required;verbal cues required  - conditional independence  -LUCIANO     Bed Mob, Sit to Supine, [place] contact guard assist;verbal cues required;nonverbal cues required (demo/gesture)   7/10 w/indep attempt  - conditional independence  -LUCIANO     Recorded by [JM] Barb Encinas PTA [LUCIANO] Louis Sutherland PTA     Transfer Assessment/Treatment    Transfers, Sit-Stand [place] independent;verbal cues required  - supervision required  -LUCIANO     Transfers, Stand-Sit [place] independent;verbal cues required  - supervision required  -LUCIANO     Transfers, Sit-Stand-Sit, Assist Device rolling walker  - rolling walker  -LUCIANO     Toilet Transfer, [place]  supervision required  -LUCIANO     Toilet Transfer, Assistive Device  rolling walker  -LUCIANO     Transfer, Impairments  strength decreased;pain  -LUCIANO     Recorded by [JM] Barb Encinas PTA [LUCIANO] Louis Sutherland PTA     Gait Assessment/Treatment    Gait, [place] Level supervision required;verbal cues required  - supervision required  -LUCIANO     Gait, Assistive Device rolling walker  - rolling walker  -LUCIANO     Gait, Distance (Feet) 120  - 400  -LUCIANO     Gait, Gait Deviations  angela decreased;left:;antalgic;decreased heel strike;weight-shifting ability decreased  -LUCIANO     Gait, Impairments  strength decreased;pain  -LUCIANO     Gait, Comment pn limiting  -      Recorded by [JM] Barb Encinas PTA [LUCIANO] Louis Sutherland PTA     Stairs Assessment/Treatment    Number of Stairs 4  -      Stairs, Handrail Location right side (ascending)  -      Stairs, [place] Level contact guard assist;verbal cues required  -      Stairs, Technique Used step to step (ascending);step to step (descending)  -       Recorded by [IJEOMA] Barb Encinas PTA      Therapy Exercises    Exercise Protocols total hip  -IJEOMA total hip  -LUCIANO     Total Hip Exercises left:;20 reps;completed protocol  -JM left:;15 reps;completed protocol  -LUCIANO     Recorded by [IJEOMA] Barb Encinas PTA [LUCIANO] Louis Sutherland PTA     Positioning and Restraints    Pre-Treatment Position sitting in chair/recliner  -JM in bed  -LUCIANO     Post Treatment Position  chair  -LUCIANO     In Chair reclined;call light within reach;encouraged to call for assist  -JM reclined;call light within reach;encouraged to call for assist;with family/caregiver  -LUCIANO     Recorded by [IJEOMA] Barb Encinas PTA [LUCIANO] Louis Sutherland PTA       User Key  (r) = Recorded By, (t) = Taken By, (c) = Cosigned By    Initials Name Effective Dates    IJEOMA Encinas PTA 02/18/16 -     LUCIANO Sutherland PTA 04/24/15 -           PT Recommendation and Plan  Anticipated Discharge Disposition: home with home health  Planned Therapy Interventions: balance training, bed mobility training, gait training, home exercise program, patient/family education, stair training, strengthening, stretching, transfer training  PT Frequency: 2 times/day  Plan of Care Review  Plan Of Care Reviewed With: patient  Progress: improving  Outcome Summary/Follow up Plan: adequate pain control, up with assist with walker.          Outcome Measures       01/11/17 1500 01/10/17 1600 01/10/17 0900    How much help from another person do you currently need...    Turning from your back to your side while in flat bed without using bedrails? 3  - 4  -LUCIANO 4  -EE    Moving from lying on back to sitting on the side of a flat bed without bedrails? 3  - 4  -LUCIANO 3  -EE    Moving to and from a bed to a chair (including a wheelchair)? 4  - 3  -LUCIANO 3  -EE    Standing up from a chair using your arms (e.g., wheelchair, bedside chair)? 4  - 3  -LUCIANO 3  -EE    Climbing 3-5 steps with a railing? 3  - 3  -LUCIANO 3  -EE    To walk in hospital room? 4  - 3  -LUCIANO 3   -EE    AM-PAC 6 Clicks Score 21  -JM 20  -LUCIANO 19  -EE    Functional Assessment    Outcome Measure Options  AM-PAC 6 Clicks Basic Mobility (PT)  -LUCIANO AM-PAC 6 Clicks Basic Mobility (PT)  -EE      User Key  (r) = Recorded By, (t) = Taken By, (c) = Cosigned By    Initials Name Provider Type    EE Stacy Lang, PT Physical Therapist    IJEOMA Encinas PTA Physical Therapy Assistant    LUCIANO Sutherland PTA Physical Therapy Assistant           Time Calculation:         PT Charges       01/11/17 1536          Time Calculation    Start Time 0930  -      Stop Time 1015  -JM      Time Calculation (min) 45 min  -      PT Received On 01/11/17  -        User Key  (r) = Recorded By, (t) = Taken By, (c) = Cosigned By    Initials Name Provider Type    IJEOMA Encinas PTA Physical Therapy Assistant          Therapy Charges for Today     Code Description Service Date Service Provider Modifiers Qty    52770440050 HC PT THER PROC EA 15 MIN 1/11/2017 Barb Encinas PTA GP 2    46964328156 HC PT THER PROC GROUP 1/11/2017 Barb Encinas PTA GP 1          PT G-Codes  Outcome Measure Options: AM-PAC 6 Clicks Basic Mobility (PT)    PT Discharge Summary  Anticipated Discharge Disposition: home with home health  Reason for Discharge: Discharge from facility  Outcomes Achieved: Refer to plan of care for updates on goals achieved  Discharge Destination: Home with assist, Home with home health    Barb Encinas PTA  1/11/2017

## 2017-01-11 NOTE — PLAN OF CARE
Problem: Patient Care Overview (Adult)  Goal: Plan of Care Review  Outcome: Ongoing (interventions implemented as appropriate)    01/11/17 0523   Coping/Psychosocial Response Interventions   Plan Of Care Reviewed With patient   Patient Care Overview   Progress improving   Outcome Evaluation   Outcome Summary/Follow up Plan adequate pain control, up with assist with walker.       Goal: Adult Individualization and Mutuality  Outcome: Ongoing (interventions implemented as appropriate)  Goal: Discharge Needs Assessment  Outcome: Ongoing (interventions implemented as appropriate)    Problem: Fall Risk (Adult)  Goal: Identify Related Risk Factors and Signs and Symptoms  Outcome: Ongoing (interventions implemented as appropriate)  Goal: Absence of Falls  Outcome: Ongoing (interventions implemented as appropriate)

## 2017-01-11 NOTE — DISCHARGE SUMMARY
DATE OF ADMISSION: 01/07/2017    DATE OF DISCHARGE: 01/11/2017    DISCHARGE DIAGNOSIS: Left hip femoral neck fracture.     OTHER DIAGNOSES:  1.  Expected acute blood loss anemia.  2.  Leukocytosis.  3.  Abnormal EKG.  4.  Alcohol dependence.  5.  Tobacco usage.   6.  Hyperglycemia without diabetes.   7.  ADD.  8.  Macrocytosis.     CONSULTANTS: Trever Marcos MD      PROCEDURES PERFORMED:   1.  Left total hip replacement on 01/09/2017.   2.  Echocardiogram 01/08/2017 which showed EF of 66%. Normal contractility, LV wall segments. No pericardial effusion.     HOSPITAL COURSE: The patient was admitted to our service after having, having had a fall the night prior to admission. She had a left subcapital femur fracture. Her admission. EKG was abnormal, although she had no symptoms of heart failure ischemia. She was seen by orthopedics. Total hip replacement was recommended and that was done on 01/09/2017. She had an echocardiogram for the abnormal EKG.  Cardiac function was normal. Alcohol and cigarette usage were discussed with her. She is recommended to quit both.  She has done very well. She is sure ready for discharge today. She has been up and about using her walker.     CONDITION: Stable.     PROGNOSIS: Good.     DIAGNOSTIC DATA: Labs today: Hemoglobin 10.1.  From 01/08/2017 white count was 13.6, hemoglobin 13.1, platelets 210,000.  From 01/10/2017, sodium 138, potassium 3.8, chloride 98, CO2 of 27, BUN 6, creatinine 0.5, glucose 226. Hemoglobin A1c 5.  B12 greater than 2000. Folate 18.  Blood type is of 0-. TSH 3.7.     DISCHARGE MEDICATIONS:  1.  Tylenol 325 mg 2 p.o. q.6 h. p.r.n. pain.  2.  Aspirin 325 mg b.i.d. for 29 days per orthopedics.   3.  [place] 7.5/325, take 2 p.o. q.4 h. p.r.n. pain, #40 no refills.   4.  Melatonin 1 mg at bedtime p.r.n. sleep.   5.  Mobic 15 mg daily, #29, no refills.     HOME MEDICATIONS:   1.  Adderall 10 mg daily.   2.  Excedrin Migraine 1 q.6 h. p.r.n.   3.  B complex vitamin  daily   4.  Fish oil 2000 mg daily.   5.  Multivitamin daily.     ACTIVITY: Restrictions per orthopedics.     DIET: Regular.     FOLLOWUP:   1.  Followup with Dr. Doyle for the abnormal EKG and leukocytosis. Note that echocardiogram is normal.   2.  Follow up with Dr. Trever Marcos as he recommends.   3.  Home health to follow for PT.     I have discussed the above with the patient. Medical record reviewed. Total time including preparation for discharge was 35 minutes.       Latasha Faulkner M.D.  JH:geraldo  D:   01/11/2017 09:12:02  T:   01/11/2017 14:00:07  Job ID:   [id number]  Document ID:   [id number]  cc:

## 2017-01-11 NOTE — PROGRESS NOTES
Orthopedic Total Hip Progress Note      Patient: Chantell Knight  Date of Admission: 1/7/2017  YOB: 1955  Medical Record Number: [id number]    POD # :  2 Days Post-Op Procedure(s) (LRB):  LEFT TOTAL HIP ARTHROPLASTY (POSTERIOR) (Left)    Systemic or Specific Complaints: No Complaints    Pain Relief: complete resolution    Physical Exam:  61 y.o.  female  Vitals:  Temp:  [97.2 °F (36.2 °C)-98.9 °F (37.2 °C)] 98.5 °F (36.9 °C)  Heart Rate:  [72-89] 89  Resp:  [16] 16  BP: ()/(63-69) 102/66  alert and oriented  Chest: Clear to auscultation  CV: Regular Rate and Rhythm  Abd: Soft, NT, with BS +  Ext: NV intact. ROM appropriate. Calf is soft and nontender. Negative Homans Sn  Skin: Incision clean dry and intact w/out signs or  symptoms of infection.    Activity: Mobilizing Per P.T.   Weight Bearing: As Tolerated    Data Review     Admission on 01/07/2017   No results displayed because visit has over 200 results.          Xr Hip 1 View Without Pelvis Left (surgery Only)    Result Date: 1/9/2017  Narrative: PORTAL VIEWS OF THE LEFT HIP  CLINICAL HISTORY: Postop arthroplasty  A single AP portable view demonstrates a total hip arthroplasty that appears in satisfactory position.  This report was finalized on 1/9/2017 6:25 PM by Dr. Hiren Whatley MD.      Xr Chest 1 View    Result Date: 1/7/2017  Narrative: XR CHEST 1 VW-  HISTORY: Female who is 61 years-old,  preoperative evaluation  TECHNIQUE: Frontal view of the chest  COMPARISON: None available  FINDINGS: Heart, mediastinum and pulmonary vasculature are unremarkable. No focal pulmonary consolidation, pleural effusion, or pneumothorax. No acute osseous process.      Impression: No evidence for acute pulmonary process. Follow-up as clinical indications persist.  This report was finalized on 1/7/2017 1:18 PM by Dr. Prabhu Bowen MD.      Xr Hip With Or Without Pelvis 2 - 3 View Left    Result Date: 1/7/2017  Narrative: PELVIS AND LEFT HIP X-RAY   HISTORY: Fall, left hip pain.  FINDINGS:  An AP view of the pelvis and 2 images of the left hip are provided and show an acute subcapital femoral neck fracture with varus angulation. The bone along the femoral head side of the fracture site somewhat heterogeneous appearance, lacking a clearly demarcated fracture plane. This could be due to the angle of the femoral head  on the frontal view. However, the possibility of a pathologic fracture due to underlying bone lesion should be considered. There is no evidence of bone lesion elsewhere in the pelvis, proximal femurs, or visualized lower lumbar spine. There is no other fracture. Some surgical chain sutures are observed in the pelvis.      Impression: Acute left subcapital femoral neck fracture. There is varus angulation which might account for the poor demarcation of the fracture plane along the femoral head side of the fracture. However, the possibility of an underlying bone lesion should be considered. There are no findings elsewhere to suggest other bone lesions.  This report was finalized on 1/7/2017 5:58 PM by Dr. Jose Angel Urbina MD.        Medications:    aspirin 325 mg Oral BID   docusate sodium 100 mg Oral BID   meloxicam 15 mg Oral Daily   mupirocin  Each Nare BID   pantoprazole 40 mg Oral Q AM   polyethylene glycol 17 g Oral BID     •  acetaminophen  •  diphenhydrAMINE  •  HYDROcodone-acetaminophen  •  HYDROcodone-acetaminophen  •  melatonin  •  ondansetron **OR** ondansetron ODT **OR** ondansetron  •  promethazine **OR** promethazine    Assessment:  Doing well POD  # 2 Days Post-Op Procedure(s) (LRB):  LEFT TOTAL HIP ARTHROPLASTY (POSTERIOR) (Left)  Problem List Items Addressed This Visit        Musculoskeletal and Integument    * (Principal)Closed fracture of neck of left femur - Primary    Relevant Orders    Commode Chair      Other Visit Diagnoses     Femoral neck fracture        Relevant Orders    Tissue Exam    Commode Chair          Plan:  Posterior  hip precautions  Continue efforts to mobilize-  WBAT - Home PT for JAVON protocol  Continue Pain Control Measures  Continue incisional Care-  Ok to leave dressing on unless saturated, OK to shower if dressing intact  DVT prophylaxis - ECASA 325 mg po bid x 2 weeks then qd x 4 weeks    Discharge Plan:Home today from ortho standpoint  RTO Dr Trever Marcos in 2 weeks call for appt - [phone]    Trever Marcos MD    Date: 1/11/2017  Time: 8:56 AM

## 2017-01-27 ENCOUNTER — OFFICE VISIT (OUTPATIENT)
Dept: ORTHOPEDIC SURGERY | Facility: CLINIC | Age: 62
End: 2017-01-27

## 2017-01-27 VITALS — WEIGHT: 130 LBS | BODY MASS INDEX: 20.89 KG/M2 | HEIGHT: 66 IN | TEMPERATURE: 97.4 F

## 2017-01-27 DIAGNOSIS — Z96.642 STATUS POST TOTAL REPLACEMENT OF LEFT HIP: Primary | ICD-10-CM

## 2017-01-27 PROCEDURE — 73502 X-RAY EXAM HIP UNI 2-3 VIEWS: CPT | Performed by: ORTHOPAEDIC SURGERY

## 2017-01-27 PROCEDURE — 99024 POSTOP FOLLOW-UP VISIT: CPT | Performed by: ORTHOPAEDIC SURGERY

## 2017-01-27 NOTE — LETTER
January 27, 2017     Patient: Chantell Knight   YOB: 1955   Date of Visit: 1/27/2017       To Whom It May Concern:    It is my medical opinion that Chantell Knight may return to full duty immediately with no restrictions on 2/8/17.           Sincerely,        Trever Marcos MD    CC: No Recipients

## 2017-01-27 NOTE — PROGRESS NOTES
Chantell Knight : 1955 MRN: [id number] DATE: 2017    DIAGNOSIS: 2 week follow up left total hip for fracture    SUBJECTIVE:Patient returns today for 2 week follow up of left total hip replacement. Patient reports doing well with no unusual complaints. Appears to be progressing appropriately.    OBJECTIVE:   Exam:. The incision is healing appropriately. No sign of infection. Range of motion is progressing as expected. The calf is soft and nontender with a negative Homans sign.    DIAGNOSTIC STUDIES  Xrays: 2 views of the left hip (AP pelvis and lateral left hip) were ordered and reviewed for evaluation of recent hip replacement. They demonstrate a well positioned, well aligned hip replacement without complicating factors noted. In comparison with previous films there has been interval implant placement.    ASSESSMENT: 2 week status post left hip replacement.    PLAN: 1) Staples removed and steri strips applied   2) PT exercises   3) Discontinue LIVAN hose   4) Continue ice PRN   5) WBAT   6) aspirin 325 mg orally every day for 1 month   7) Follow up in 6 weeks with repeat Xrays of left hip (2views)    Trever Marcos MD  2017

## 2017-01-27 NOTE — MR AVS SNAPSHOT
Chantell Knight   2017 1:30 PM   Office Visit    Dept Phone:  [phone]   Encounter #:  [id number]    Provider:  Trever Marcos MD   Department:  [facility]                Your Full Care Plan              Your Updated Medication List          This list is accurate as of: 17  2:49 PM.  Always use your most recent med list.                amphetamine-dextroamphetamine 10 MG tablet   Commonly known as:  ADDERALL       aspirin 325 MG EC tablet   Take 1 tablet by mouth 2 (Two) Times a Day for 29 days.       aspirin-acetaminophen-caffeine 250-250-65 MG per tablet   Commonly known as:  EXCEDRIN MIGRAINE       b complex-C-folic acid 1 MG capsule       fish oil 1000 MG capsule capsule       meloxicam 15 MG tablet   Commonly known as:  MOBIC   Take 1 tablet by mouth Daily for 29 days. Indications: Joint Damage causing Pain and Loss of Function       MULTIVITAL PO               We Performed the Following     XR Hip With or Without Pelvis 2 - 3 View Left       You Were Diagnosed With        Codes Comments    Status post total replacement of left hip    -  Primary ICD-10-CM: Z96.642  ICD-9-CM: V43.64       Instructions     None    Patient Instructions History      Upcoming Appointments     Visit Type Date Time Department    POST-OP 2017  1:30 PM [facility]    POST-OP 3/23/2017  2:15 PM [facility]      [facility] Signup     [facility] allows you to send messages to your doctor, view your test results, renew your prescriptions, schedule appointments, and more. To sign up, go to [employer] and click on the Sign Up Now link in the New User? box. Enter your [facility] Activation Code exactly as it appears below along with the last four digits of your Social Security Number and your Date of Birth () to complete the sign-up process. If you do not sign up before the expiration date, you must request a new  "code.    SYLOB Activation Code: [email or web address]  Expires: 2/10/2017  2:49 PM    If you have questions, you can email [email or web address] or call [phone] to talk to our [employer] staff. Remember, [employer] is NOT to be used for urgent needs. For medical emergencies, dial 911.               Other Info from Your Visit           Your Appointments     Mar 23, 2017  2:15 PM EDT   Post-Op with Trever Marcos MD   [facility] [place] [facility] (--)    [address] [place] [address]   [phone]              Allergies     Penicillins  Diarrhea      Reason for Visit     Left Hip - Post-op     Suture / Staple Removal           Vital Signs     Temperature Height Weight Body Mass Index Smoking Status       97.4 °F (36.3 °C) 66\" (167.6 cm) 130 lb (59 kg) 20.98 kg/m2 Current Every Day Smoker       Problems and Diagnoses Noted     Status post total replacement of left hip    -  Primary        "

## 2017-03-23 ENCOUNTER — OFFICE VISIT (OUTPATIENT)
Dept: ORTHOPEDIC SURGERY | Facility: CLINIC | Age: 62
End: 2017-03-23

## 2017-03-23 VITALS — WEIGHT: 129 LBS | BODY MASS INDEX: 20.73 KG/M2 | TEMPERATURE: 97.6 F | HEIGHT: 66 IN

## 2017-03-23 DIAGNOSIS — Z96.642 HISTORY OF LEFT HIP REPLACEMENT: Primary | ICD-10-CM

## 2017-03-23 PROCEDURE — 99024 POSTOP FOLLOW-UP VISIT: CPT | Performed by: ORTHOPAEDIC SURGERY

## 2017-03-23 RX ORDER — CLINDAMYCIN HYDROCHLORIDE 300 MG/1
600 CAPSULE ORAL ONCE
Qty: 2 CAPSULE | Refills: 5 | Status: SHIPPED | OUTPATIENT
Start: 2017-03-23 | End: 2017-03-23

## 2017-03-23 RX ORDER — CLINDAMYCIN HYDROCHLORIDE 300 MG/1
600 CAPSULE ORAL ONCE
Qty: 2 CAPSULE | Refills: 5 | Status: SHIPPED | OUTPATIENT
Start: 2017-03-23 | End: 2017-03-23 | Stop reason: SDUPTHER

## 2017-03-23 NOTE — PROGRESS NOTES
Chantell Knight : 1955 MRN: [id number] DATE: 3/23/2017    DIAGNOSIS: 8 week follow up left total hip     SUBJECTIVE:Patient returns today for 8 week follow up of left total hip replacement. Patient reports doing well with no unusual complaints. Appears to be progressing appropriately and is off a cane    OBJECTIVE:   Exam:. The incision is healed. No sign of infection. Range of motion is progressing as expected. The calf is soft and nontender with a negative Homans sign. Strength progressing    DIAGNOSTIC STUDIES  Xrays: 2 views of the left hip (AP pelvis and lateral left hip) were ordered and reviewed for evaluation of recent hip replacement. They demonstrate a well positioned, well aligned hip replacement without complicating factors noted. In comparison with previous films there has been interval implant placement.    ASSESSMENT: 8 week status post left hip replacement.    PLAN: 1) Activity as tolerated   2) Continue hip strengthening exercises    3) Follow up 1 year post-op with repeat Xrays of left hip (2views AP Pelvis and lateral left hip)    Trever Marcos MD  3/23/2017

## 2018-06-26 NOTE — NURSING NOTE
Discussed with Dr Sky that pt states pain med wearing off before dose due again and that pt wanted valium for muscle spasms and orders received  
Dr BADILLO NOTIFIED OF PTS C/O'S UNRELIEVED PAIN AND THAT SHE SAID IV DILAUDID GAVE BETTER RELIEF--ORDERS RECEIVED  
Dr Gore  NOTIFIED THAT PATIENT C/O CONSTANT MUSCLE SPASMS AND IS INQUIRING ABOUT TRACTION, ALSO THAT PT STATES SHE DOESN'T THINK SHE CAN MAKE IT UNTIL Monday TO HAVE SURGERY, DR GORE STATES HE DOESNOT WANT TO USE TRACTION AND TO MAKE PT NPO AT MIDNIGHT AND HE WILL DISCUSS WITH PATIENT  
Medina catheter attempted in ER, unsuccessful and very painful for patient. Patient does not want to attempt medina catheter again at this point due to pain and discomfort.   
Universal Safety Interventions

## 2021-03-22 ENCOUNTER — BULK ORDERING (OUTPATIENT)
Dept: CASE MANAGEMENT | Facility: OTHER | Age: 66
End: 2021-03-22

## 2021-03-22 DIAGNOSIS — Z23 IMMUNIZATION DUE: ICD-10-CM

## 2023-06-15 ENCOUNTER — OFFICE VISIT (OUTPATIENT)
Dept: ORTHOPEDIC SURGERY | Facility: CLINIC | Age: 68
End: 2023-06-15
Payer: MEDICARE

## 2023-06-15 VITALS — TEMPERATURE: 97.6 F | HEIGHT: 66 IN | BODY MASS INDEX: 19.48 KG/M2 | WEIGHT: 121.2 LBS

## 2023-06-15 DIAGNOSIS — R52 PAIN: Primary | ICD-10-CM

## 2023-06-15 DIAGNOSIS — R29.898 LEFT LEG WEAKNESS: ICD-10-CM

## 2023-06-15 DIAGNOSIS — Z96.642 HISTORY OF TOTAL HIP REPLACEMENT, LEFT: ICD-10-CM

## 2023-06-15 PROCEDURE — 99203 OFFICE O/P NEW LOW 30 MIN: CPT | Performed by: NURSE PRACTITIONER

## 2023-06-15 NOTE — PROGRESS NOTES
"Chantell Knight : 1955 MRN: [id number] DATE: 6/15/2023    CHIEF COMPLAINT:  Follow up left total hip      SUBJECTIVE:Patient returns today for a general follow up of left total hip replacement. Patient reports Dr. Marcos did her hip replacement several years ago.  Patient states as of recent she has been having some left leg weakness.  She denies any fall injury or trauma to the leg.  Patient reports that her gait appears to be off and she feels a little unsteady on her feet.  She states that she also has some difficulty going up and down steps.  Patient denies any pain or any radicular symptoms.  She denies any signs or symptoms of infection, and she is without any other significant complaints today.    OBJECTIVE:    Temp 97.6 °F (36.4 °C) (Temporal)   Ht 167.6 cm (66\")   Wt 55 kg (121 lb 3.2 oz)   BMI 19.56 kg/m²   History reviewed. No pertinent family history.  History reviewed. No pertinent past medical history.  Past Surgical History:   Procedure Laterality Date    COLON RESECTION LEFT  2013    due to several ruptured diverticuli     TOTAL HIP ARTHROPLASTY Left 2017    Procedure: LEFT TOTAL HIP ARTHROPLASTY (POSTERIOR);  Surgeon: Trever Marcos MD;  Location: [facility];  Service:      Social History     Socioeconomic History    Marital status:    Tobacco Use    Smoking status: Every Day     Packs/day: 0.25     Types: Cigarettes   Vaping Use    Vaping Use: Former    Substances: Nicotine   Substance and Sexual Activity    Alcohol use: Yes     Alcohol/week: 2.0 - 3.0 standard drinks     Types: 2 - 3 Cans of beer per week     Comment: per day     Drug use: No    Sexual activity: Defer       Review of Systems: 14 point review of systems performed pertinent positives and negatives discussed above, all other systems are negative    Exam:. The incision is well healed. Range of motion is good without irritability. The calf is soft and nontender with a negative Homans sign. Alignment is " neutral. Leg lengths are equal. Good hip flexion and abduction strength.Walks with a valgus thrust.  Patient's right lower extremity has obvious valgus alignment.  Intact to light touch with palpable distal pulses.     DIAGNOSTIC STUDIES  Xrays:Xrays 2 view left hip AP and lateral were ordered and reviewed for follow up of total joint which demonstrate a well positioned JAVON without complicating factors.  In comparison to previous films there are no changes.    ASSESSMENT:   Follow up left hip replacement /left lower extremity weakness       PLAN:      Treatment options as well as imaging results were discussed in detail with the patient.  At this point in time I would like to proceed forward with conservative measures.  I am going to give the patient a prescription for physical therapy to work on core and hip strengthening exercises.  As well as a evaluating and help adjusting her gait.  She can follow back up with me on an as-needed basis.    Gaston Craig, APRN  6/15/2023

## 2024-07-29 ENCOUNTER — TELEPHONE (OUTPATIENT)
Dept: ORTHOPEDIC SURGERY | Facility: CLINIC | Age: 69
End: 2024-07-29

## 2024-07-29 NOTE — TELEPHONE ENCOUNTER
Hub staff attempted to follow warm transfer process and was unsuccessful     Caller: Chantell Knight    Relationship to patient: Self    Best call back number: [phone]    Patient is needing: RAFAELA HAD A FALL ON 7-24-24 AND WAS SEEN AT [facility] YESTERDAY. XRAYS SHOWED A LEFT HIP FRACTURE. NO AVAILABILITY UNTIL 8-1-24. PLEASE CALL FOR SCHEDULING.

## 2024-07-30 ENCOUNTER — OFFICE VISIT (OUTPATIENT)
Dept: ORTHOPEDIC SURGERY | Facility: CLINIC | Age: 69
End: 2024-07-30
Payer: MEDICARE

## 2024-07-30 VITALS — BODY MASS INDEX: 20.09 KG/M2 | HEIGHT: 66 IN | WEIGHT: 125 LBS | TEMPERATURE: 98.3 F

## 2024-07-30 DIAGNOSIS — R52 PAIN: Primary | ICD-10-CM

## 2024-07-30 DIAGNOSIS — S72.102A CLOSED FRACTURE OF TROCHANTER OF LEFT FEMUR, INITIAL ENCOUNTER: ICD-10-CM

## 2024-07-30 PROCEDURE — 73502 X-RAY EXAM HIP UNI 2-3 VIEWS: CPT | Performed by: NURSE PRACTITIONER

## 2024-07-30 PROCEDURE — 99213 OFFICE O/P EST LOW 20 MIN: CPT | Performed by: NURSE PRACTITIONER

## 2024-07-30 RX ORDER — TRAMADOL HYDROCHLORIDE 50 MG/1
50 TABLET ORAL EVERY 4 HOURS PRN
Qty: 30 TABLET | Refills: 0 | Status: SHIPPED | OUTPATIENT
Start: 2024-07-30

## 2024-07-30 NOTE — PROGRESS NOTES
Patient: Chantell Knight  YOB: 1955 68 y.o. female  Medical Record Number: [id number]    Chief Complaints:   Chief Complaint   Patient presents with    Left Hip - Pain, Initial Evaluation       History of Present Illness:Chantell Knight is a 68 y.o. female who presents with complaints of left hip pain that is acute in nature.  Patient states that she tripped and fell last Thursday injuring her left hip.  She states that symptoms progressively worsened and went to the ER at [place] on Sunday and was told that she had a fracture to her femur.  Patient states that she was instructed to follow-up with orthopedics for further evaluation.  Patient reports the pain is located to the lateral portion of her hip and describes it as a severe ache and sometimes sharp pain.  States his symptoms are worse with trying to ambulate and walk and weightbearing.  She states that she has previously been having some issues in which her left lower extremity is wanting to buckle and give way sometimes not even wanting to move.  She presents today nonweightbearing in a wheelchair.    Allergies:   Allergies   Allergen Reactions    Penicillins Diarrhea       Medications:   Current Outpatient Medications   Medication Sig Dispense Refill    amphetamine-dextroamphetamine (ADDERALL) 10 MG tablet TK 1 T PO QD FOR ADD  0    aspirin-acetaminophen-caffeine (EXCEDRIN MIGRAINE) 250-250-65 MG per tablet Take 1 tablet by mouth Every 6 (Six) Hours As Needed for Headache.      Multiple Vitamins-Minerals (MULTIVITAL PO) Take 1 tablet by mouth Daily.      Omega-3 Fatty Acids (FISH OIL) 1000 MG capsule capsule Take 2 capsules by mouth Daily With Breakfast.      b complex-C-folic acid 1 MG capsule Take 1 capsule by mouth Daily. (Patient not taking: Reported on 7/30/2024)      traMADol (ULTRAM) 50 MG tablet Take 1 tablet by mouth Every 4 (Four) Hours As Needed for Moderate Pain. 30 tablet 0     No current facility-administered medications for  "this visit.         The following portions of the patient's history were reviewed and updated as appropriate: allergies, current medications, past family history, past medical history, past social history, past surgical history and problem list.    Review of Systems:   Pertinent positives/negative listed in HPI above    Physical Exam:   Vitals:    07/30/24 0938   Temp: 98.3 °F (36.8 °C)   TempSrc: Temporal   Weight: 56.7 kg (125 lb)   Height: 167.6 cm (66\")   PainSc:   9   PainLoc: Hip       General: A and O x 3, ASA, NAD      Hip:  left    LEG ALIGNMENT:     Neutral   ,    equal leg lengths    GAIT: Unable to assess due to pain    SKIN:     No abnormality    RANGE OF MOTION: Limited due to discomfort    STRENGTH:    Limited due to discomfort    DISTAL PULSES:    Paplable    DISTAL SENSATION :   Intact    LYMPHATICS:     No   lymphadenopathy    OTHER:          - Negative Stinchfeld test      - Negative log roll      +Tenderness to palpation trochanteric bursa         Radiology:  Xrays 2views left hip (AP bilateral hips, and lateral of the hip) ordered and reviewed for evaluation of hip pain  demonstrating an avulsion fracture noted to the greater trochanter.    Assessment/Plan: Avulsion fracture left femur greater trochanter    Treatment option as well as imaging results were discussed in detail with the patient.  At this point in time we will treat this issue nonoperatively and conservatively.  We have instructed the patient that she can be weightbearing as tolerated with a walker.  I am going to give her a prescription for tramadol 50 mg p.o. every 4 hours as needed for pain control.  He can take Tylenol for mild pain.  We can give a prescription for physical therapy as well as Occupational Therapy to get her up and get her ambulation.  She should avoid hip abduction exercises.  She can follow back up in 6 weeks for reevaluation as well as repeat x-rays.    Gaston Craig, APRN  7/30/2024  "

## 2024-07-31 ENCOUNTER — HOME HEALTH ADMISSION (OUTPATIENT)
Dept: HOME HEALTH SERVICES | Facility: HOME HEALTHCARE | Age: 69
End: 2024-07-31
Payer: COMMERCIAL

## 2024-08-01 ENCOUNTER — HOME CARE VISIT (OUTPATIENT)
Dept: HOME HEALTH SERVICES | Facility: HOME HEALTHCARE | Age: 69
End: 2024-08-01
Payer: COMMERCIAL

## 2024-08-01 PROCEDURE — G0151 HHCP-SERV OF PT,EA 15 MIN: HCPCS

## 2024-08-02 ENCOUNTER — HOME CARE VISIT (OUTPATIENT)
Dept: HOME HEALTH SERVICES | Facility: HOME HEALTHCARE | Age: 69
End: 2024-08-02
Payer: MEDICARE

## 2024-08-02 VITALS
SYSTOLIC BLOOD PRESSURE: 144 MMHG | HEART RATE: 75 BPM | RESPIRATION RATE: 18 BRPM | TEMPERATURE: 96.5 F | DIASTOLIC BLOOD PRESSURE: 78 MMHG | OXYGEN SATURATION: 100 %

## 2024-08-02 VITALS
HEART RATE: 81 BPM | OXYGEN SATURATION: 96 % | DIASTOLIC BLOOD PRESSURE: 73 MMHG | TEMPERATURE: 97.3 F | SYSTOLIC BLOOD PRESSURE: 135 MMHG

## 2024-08-02 PROCEDURE — G0152 HHCP-SERV OF OT,EA 15 MIN: HCPCS

## 2024-08-02 NOTE — HOME HEALTH
"SOC Note:    Home Health ordered for: disciplines PT 1w1, 2w3; OT eval    Reason for Hosp/Primary Dx/Co-morbidities:  Avulsion fracture left femur greater trochanter per xray at [facility] visit [id number]; fell [id number] at home and went to [facility] [id number] with no fractures on their report; PMHx: L posterior THR 2017 due to hip fracture from slip on black ice, ADD, + smoker x 50 years, L sole of foot has long-term skin irritation which she was seen by dermatologist 1.5 years ago thru Jan 2024 initially thought was fungus but then changed diagnosis to psoriasis and was treated with steroids and became less painful and less irritation but stopped taking in Jan and has not gone back to dermatologist    Focus of Care: Skilled PT is needed for left avulsion fracture left femoral greater trochanter for gait pattern training with rollator progressing to cane/quad cane when tolerated, stair training, transfer training on off all household surfaces, HEP instruction for strength/balance training as tolerated with no hip abduction allowed due to fx, and patient education for medication knowledge, pain control measures and home safety, in order to return to independent household mobility and then community mobility with devices as needed    Patient's goal(s): \"I want to be able to get out of my apartment on my own with less pain.\"    Current Functional status/mobility/DME: using rollator currently with supervision in her apartment with WBAT L LE; DME includes rollator, new bath bench son needs to put together, quad cane, st cane    HB status/Living Arrangements: limited gait using rollator with assist needed up down 2 steps to leave apartment building    Skin Integrity/wound status: intact    Code Status: full    Fall Risk/Safety concerns: high falls risk due to pain/weakness left leg and hx of previous falls    Educated on Emergency Plan, steps to take prior to going to the ER and when to Call Home Health " First: yes understood     Medication issues/Concerns: all medications present in home; not taking tramadol instead alternating ibuprofen and excedrin extra strength during day and use ibuprofen PM overnight    Additional Problems/Concerns: none    SDOH Barriers (i.e. caregiver concerns, social isolation, transportation, food insecurity, environment, income etc.)/Need for MSW: none

## 2024-08-02 NOTE — HOME HEALTH
"REASON FOR REFERRAL: Pt is a 67 y/o female who fell in her home who suffered an avulsion fx of her L femur greater trochanter according to [facility] on 7/30/24. Pt fell at home on 7/25 and was told by [facility] she has no fx.   PMHx: L posterior THR 2017, ADD, smoker  OT's FOCUS OF CARE: home safety  SUBJECTIVE: \"I'm ok if I'm sitting here, but its awful getting up and around the condo\"  SOCIAL & ENVIRONMENTAL SITUATION: Pt lives in a condo on the first floor with 2 PRINCE. Pt lives alone but her son often checks in and supports her as needed. Pt was previously I with all ADLs, IADLs, driving, and med mgmt. Pt reports she was only using the rollator early in the morning and some at night, otherwise she went without it during the day and without it in the community. Pt recently purchased a tub t/f bench which was assembled upon OT arrivala and reports she would rather complete a shower when her son is there to distantly supervise. Pt reports she is anxious about bathing due to FOF.  PATIENT'S &/OR CAREGIVER'S GOAL: to feel comfortable taking a shower  INTERVENTIONS: home safety  ASSESSMENT: No skilled OT services indicated at this time. Pt is I/MI with all ADLs with the exception of bathing and requests her son to distantly supervise vs therapist. Pt demonstrates ability to transfer with MI.   PLAN: OT eval only."

## 2024-08-02 NOTE — Clinical Note
Dear Dr. Marcos,   OT oneilal completed 8/2 with no skilled OT services indicated at this time. Pt remains MI-I with ADLs due to AE use and demonstrates no safety concerns.   Thank you,   Cris Tong OTR/L

## 2024-08-06 ENCOUNTER — HOME CARE VISIT (OUTPATIENT)
Dept: HOME HEALTH SERVICES | Facility: HOME HEALTHCARE | Age: 69
End: 2024-08-06
Payer: COMMERCIAL

## 2024-08-06 VITALS
SYSTOLIC BLOOD PRESSURE: 130 MMHG | RESPIRATION RATE: 18 BRPM | OXYGEN SATURATION: 96 % | TEMPERATURE: 97.3 F | HEART RATE: 84 BPM | DIASTOLIC BLOOD PRESSURE: 76 MMHG

## 2024-08-06 PROCEDURE — G0151 HHCP-SERV OF PT,EA 15 MIN: HCPCS

## 2024-08-08 ENCOUNTER — HOME CARE VISIT (OUTPATIENT)
Dept: HOME HEALTH SERVICES | Facility: HOME HEALTHCARE | Age: 69
End: 2024-08-08
Payer: COMMERCIAL

## 2024-08-13 ENCOUNTER — HOME CARE VISIT (OUTPATIENT)
Dept: HOME HEALTH SERVICES | Facility: HOME HEALTHCARE | Age: 69
End: 2024-08-13
Payer: COMMERCIAL

## 2024-08-15 ENCOUNTER — HOME CARE VISIT (OUTPATIENT)
Dept: HOME HEALTH SERVICES | Facility: HOME HEALTHCARE | Age: 69
End: 2024-08-15
Payer: COMMERCIAL

## 2024-08-15 VITALS
SYSTOLIC BLOOD PRESSURE: 140 MMHG | HEART RATE: 92 BPM | RESPIRATION RATE: 18 BRPM | TEMPERATURE: 97.2 F | DIASTOLIC BLOOD PRESSURE: 74 MMHG | OXYGEN SATURATION: 96 %

## 2024-08-15 PROCEDURE — G0151 HHCP-SERV OF PT,EA 15 MIN: HCPCS

## 2024-08-20 ENCOUNTER — HOME CARE VISIT (OUTPATIENT)
Dept: HOME HEALTH SERVICES | Facility: HOME HEALTHCARE | Age: 69
End: 2024-08-20
Payer: COMMERCIAL

## 2024-08-22 ENCOUNTER — HOME CARE VISIT (OUTPATIENT)
Dept: HOME HEALTH SERVICES | Facility: HOME HEALTHCARE | Age: 69
End: 2024-08-22
Payer: COMMERCIAL

## 2024-08-22 VITALS
TEMPERATURE: 97.5 F | DIASTOLIC BLOOD PRESSURE: 80 MMHG | RESPIRATION RATE: 18 BRPM | OXYGEN SATURATION: 98 % | HEART RATE: 96 BPM | SYSTOLIC BLOOD PRESSURE: 142 MMHG

## 2024-08-22 PROCEDURE — G0151 HHCP-SERV OF PT,EA 15 MIN: HCPCS

## 2024-08-22 NOTE — HOME HEALTH
"Subjective: \"It's getting better, I know, but I'm still scared of falling again.\"    Falls reported: none    Medication changes: none"
No

## 2024-09-12 ENCOUNTER — OFFICE VISIT (OUTPATIENT)
Dept: ORTHOPEDIC SURGERY | Facility: CLINIC | Age: 69
End: 2024-09-12
Payer: MEDICARE

## 2024-09-12 VITALS — WEIGHT: 126.2 LBS | TEMPERATURE: 97.5 F | BODY MASS INDEX: 20.28 KG/M2 | HEIGHT: 66 IN

## 2024-09-12 DIAGNOSIS — R52 PAIN: Primary | ICD-10-CM

## 2024-09-12 DIAGNOSIS — S72.102D CLOSED FRACTURE OF TROCHANTER OF LEFT FEMUR WITH ROUTINE HEALING, SUBSEQUENT ENCOUNTER: ICD-10-CM

## 2024-09-12 PROCEDURE — 73502 X-RAY EXAM HIP UNI 2-3 VIEWS: CPT | Performed by: NURSE PRACTITIONER

## 2024-09-12 PROCEDURE — 99024 POSTOP FOLLOW-UP VISIT: CPT | Performed by: NURSE PRACTITIONER

## 2024-09-12 NOTE — PROGRESS NOTES
Patient: Chantell Knight  YOB: 1955 68 y.o. female  Medical Record Number: [id number]    Chief Complaint:   Chief Complaint   Patient presents with    Left Hip - Follow-up, Pain       History of Present Illness:Chantell Knight is a 68 y.o. female who presents for follow-up of an avulsion fracture to the left greater trochanter.  Patient is approximately 6 weeks out from her initial injury.  She reports that her pain level currently is 0.  States that she is still having to use a walker for safe ambulation.  Patient reports that she has been having some difficulty with gait and balance prior to the fall.  She states this is actually what led to her fall and initially injuring herself.  She reports that her leg just does not want to move sometimes.  Patient reports that she is still having home health occupational therapy coming to the house and working with her.  States that she is making progress from her initial visit.  Otherwise she is without any other significant complaints today.    Allergies:   Allergies   Allergen Reactions    Penicillins Diarrhea       Medications:   Current Outpatient Medications   Medication Sig Dispense Refill    aspirin-acetaminophen-caffeine (Excedrin Extra Strength) 250-250-65 MG per tablet Take 1 tablet by mouth Every 6 (Six) Hours As Needed for Moderate Pain. alternates with ibuprofen  Indications: Pain      Ibuprofen 200 MG capsule Take 200 mg by mouth 2 (Two) Times a Day As Needed (pain). Indications: Pain      Ibuprofen-diphenhydrAMINE Cit (Ibuprofen PM) 200-38 MG tablet Take 1 tablet by mouth At Night As Needed (pain). Indications: pain      Multiple Vitamins-Minerals (MULTIVITAL PO) Take 1 tablet by mouth Daily. Indications: supplement vitamin      Omega-3 Fatty Acids (FISH OIL) 1000 MG capsule capsule Take 2 capsules by mouth Daily With Breakfast. Indications: supplement      amphetamine-dextroamphetamine (ADDERALL) 10 MG tablet TK 1 T PO QD FOR ADD (Patient not  "taking: Reported on 9/12/2024)  0    traMADol (ULTRAM) 50 MG tablet Take 1 tablet by mouth Every 4 (Four) Hours As Needed for Moderate Pain. (Patient not taking: Reported on 9/12/2024) 30 tablet 0     No current facility-administered medications for this visit.         The following portions of the patient's history were reviewed and updated as appropriate: allergies, current medications, past family history, past medical history, past social history, past surgical history and problem list.    Review of Systems:   Pertinent positives/negative listed in HPI above    Physical Exam:   Vitals:    09/12/24 1546   Temp: 97.5 °F (36.4 °C)   TempSrc: Temporal   Weight: 57.2 kg (126 lb 3.2 oz)   Height: 167.6 cm (66\")   PainSc: 0-No pain   PainLoc: Hip       General: A and O x 3, ASA, NAD      Hip:  left                          LEG ALIGNMENT:     Neutral   ,    equal leg lengths                          GAIT: Antalgic                          SKIN:     No abnormality                          RANGE OF MOTION: Appropriate for a hip replacement                          STRENGTH:   4/5                          DISTAL PULSES:    Paplable                          DISTAL SENSATION :   Intact no sensory deficits noted                          LYMPHATICS:     No   lymphadenopathy                          OTHER:          - Negative Stinchfeld test                                                  - Negative log roll                                                  + Mild tenderness to palpation trochanteric bursa                     Radiology:  Xrays 2views left hip (AP bilateral hips, and lateral of the hip) ordered and reviewed for evaluation of hip pain demonstrating an avulsion fracture noted to the greater trochanter.  In comparison to previous films patient does show some callus formation around the fracture site without any further displacement.     Assessment/Plan: Avulsion fracture left femur greater trochanter    Treatment " option as well as imaging results were discussed in detail with the patient.  In regards to the patient's fracture it is a healing with callus formation noted on x-ray films.  No significant further progression of the fracture.  We are still going to treat these issues nonoperatively.  She can be weightbearing as tolerated.  Activities as tolerated.  And her guards to the patient's balance and gait issues with having inability to make her leg function appropriately I do believe the patient would benefit from a neurology consult.  Patient reports that she is going to see her PCP on Monday.  I did recommend that she get referred to a neurologist for further evaluation of this issue.  Can follow back up with us on an as-needed basis.    Gaston Craig, APRN  9/12/2024

## (undated) DEVICE — ANTIBACTERIAL UNDYED BRAIDED (POLYGLACTIN 910), SYNTHETIC ABSORBABLE SUTURE: Brand: COATED VICRYL

## (undated) DEVICE — SUT VIC 1 CT1 36IN J947H

## (undated) DEVICE — ENCORE® LATEX ORTHO SIZE 7.5, STERILE LATEX POWDER-FREE SURGICAL GLOVE: Brand: ENCORE

## (undated) DEVICE — SUT VIC 0 CT1 36IN J946H

## (undated) DEVICE — DRSNG BRDR MEPILEX P/OP SIL 4X8IN

## (undated) DEVICE — KT DRN EVAC WND PVC PCH WTROC RND 10F400

## (undated) DEVICE — DRSNG SURESITE WNDW 2.38X2.75

## (undated) DEVICE — SUT PDS 1 CT1 36IN Z347H

## (undated) DEVICE — PREMIUM WET SKIN PREP TRAY: Brand: MEDLINE INDUSTRIES, INC.

## (undated) DEVICE — 3M™ IOBAN™ 2 ANTIMICROBIAL INCISE DRAPE 6640EZ: Brand: IOBAN™ 2

## (undated) DEVICE — GLV SURG OR CLASSIC PWDR LTX 7.5 STRL

## (undated) DEVICE — SPNG GZ WOVN 4X4IN 12PLY 10/BX STRL

## (undated) DEVICE — 3M™ IOBAN™ 2 ANTIMICROBIAL INCISE DRAPE 6650EZ: Brand: IOBAN™ 2

## (undated) DEVICE — SYR LUERLOK 30CC

## (undated) DEVICE — GLV SURG SENSICARE GREEN W/ALOE PF LF 7 STRL

## (undated) DEVICE — MAT FLR ABSORBENT LG 4FT 10 2.5FT

## (undated) DEVICE — OCCLUSIVE GAUZE STRIP,3% BISMUTH TRIBROMOPHENATE IN PETROLATUM BLEND: Brand: XEROFORM

## (undated) DEVICE — REFLECTION FLEXIBLE DRILL 25MM: Brand: REFLECTION

## (undated) DEVICE — DRSNG SURESITE WNDW 4X4.5

## (undated) DEVICE — PREP SOL POVIDONE/IODINE BT 4OZ

## (undated) DEVICE — PK HIP TOTL 40

## (undated) DEVICE — HANDPIECE SET WITH COAXIAL HIGH FLOW TIP AND SUCTION TUBE: Brand: INTERPULSE

## (undated) DEVICE — GLV SURG SENSICARE MICRO PF LF 7 STRL

## (undated) DEVICE — APPL DURAPREP IODOPHOR APL 26ML